# Patient Record
Sex: FEMALE | Race: WHITE | Employment: OTHER | URBAN - METROPOLITAN AREA
[De-identification: names, ages, dates, MRNs, and addresses within clinical notes are randomized per-mention and may not be internally consistent; named-entity substitution may affect disease eponyms.]

---

## 2017-09-19 ENCOUNTER — TELEPHONE (OUTPATIENT)
Dept: FAMILY MEDICINE CLINIC | Age: 82
End: 2017-09-19

## 2017-09-19 RX ORDER — ALPRAZOLAM 0.5 MG/1
TABLET ORAL
Qty: 2 TABLET | Refills: 0 | Status: SHIPPED | OUTPATIENT
Start: 2017-09-19 | End: 2020-05-19

## 2017-09-22 ENCOUNTER — HOSPITAL ENCOUNTER (OUTPATIENT)
Dept: MRI IMAGING | Age: 82
Discharge: HOME | End: 2017-09-22
Attending: INTERNAL MEDICINE | Admitting: INTERNAL MEDICINE

## 2017-09-22 DIAGNOSIS — R27.0 ATAXIA: ICD-10-CM

## 2017-09-22 DIAGNOSIS — R26.89 LOSS OF BALANCE: ICD-10-CM

## 2017-09-28 ENCOUNTER — OFFICE VISIT (OUTPATIENT)
Dept: FAMILY MEDICINE CLINIC | Age: 82
End: 2017-09-28

## 2017-09-28 VITALS
SYSTOLIC BLOOD PRESSURE: 130 MMHG | WEIGHT: 148.6 LBS | HEIGHT: 63 IN | DIASTOLIC BLOOD PRESSURE: 70 MMHG | BODY MASS INDEX: 26.33 KG/M2

## 2017-09-28 DIAGNOSIS — E78.2 MIXED HYPERLIPIDEMIA: ICD-10-CM

## 2017-09-28 DIAGNOSIS — Z23 NEED FOR INFLUENZA VACCINATION: ICD-10-CM

## 2017-09-28 DIAGNOSIS — Z09 HOSPITAL DISCHARGE FOLLOW-UP: Primary | ICD-10-CM

## 2017-09-28 DIAGNOSIS — R27.0 ATAXIA: ICD-10-CM

## 2017-09-28 PROCEDURE — 90662 IIV NO PRSV INCREASED AG IM: CPT | Performed by: FAMILY MEDICINE

## 2017-09-28 PROCEDURE — 99214 OFFICE O/P EST MOD 30 MIN: CPT | Performed by: FAMILY MEDICINE

## 2017-09-28 PROCEDURE — G0008 ADMIN INFLUENZA VIRUS VAC: HCPCS | Performed by: FAMILY MEDICINE

## 2017-09-28 ASSESSMENT — PATIENT HEALTH QUESTIONNAIRE - PHQ9
2. FEELING DOWN, DEPRESSED OR HOPELESS: 0
SUM OF ALL RESPONSES TO PHQ9 QUESTIONS 1 & 2: 1
SUM OF ALL RESPONSES TO PHQ QUESTIONS 1-9: 1
1. LITTLE INTEREST OR PLEASURE IN DOING THINGS: 1

## 2017-09-28 ASSESSMENT — ENCOUNTER SYMPTOMS
COUGH: 0
SORE THROAT: 0
WHEEZING: 0
SHORTNESS OF BREATH: 0
VOMITING: 0
EYE PAIN: 0
BACK PAIN: 0
TROUBLE SWALLOWING: 0
DIARRHEA: 0
ABDOMINAL PAIN: 0
BLOOD IN STOOL: 0

## 2018-08-01 ENCOUNTER — OFFICE VISIT (OUTPATIENT)
Dept: FAMILY MEDICINE CLINIC | Age: 83
End: 2018-08-01

## 2018-08-01 VITALS
SYSTOLIC BLOOD PRESSURE: 130 MMHG | BODY MASS INDEX: 24.65 KG/M2 | HEIGHT: 64 IN | WEIGHT: 144.4 LBS | DIASTOLIC BLOOD PRESSURE: 70 MMHG

## 2018-08-01 DIAGNOSIS — N18.30 CHRONIC KIDNEY DISEASE, STAGE III (MODERATE) (HCC): ICD-10-CM

## 2018-08-01 DIAGNOSIS — L57.0 ACTINIC KERATOSIS: Primary | ICD-10-CM

## 2018-08-01 DIAGNOSIS — E78.2 MIXED HYPERLIPIDEMIA: ICD-10-CM

## 2018-08-01 LAB
ANION GAP SERPL CALCULATED.3IONS-SCNC: 13 MMOL/L (ref 3–16)
BUN BLDV-MCNC: 23 MG/DL (ref 7–20)
CALCIUM SERPL-MCNC: 9.5 MG/DL (ref 8.3–10.6)
CHLORIDE BLD-SCNC: 102 MMOL/L (ref 99–110)
CHOLESTEROL, TOTAL: 215 MG/DL (ref 0–199)
CO2: 23 MMOL/L (ref 21–32)
CREAT SERPL-MCNC: 1 MG/DL (ref 0.6–1.2)
GFR AFRICAN AMERICAN: >60
GFR NON-AFRICAN AMERICAN: 53
GLUCOSE BLD-MCNC: 90 MG/DL (ref 70–99)
HCT VFR BLD CALC: 42.1 % (ref 36–48)
HDLC SERPL-MCNC: 82 MG/DL (ref 40–60)
HEMOGLOBIN: 13.8 G/DL (ref 12–16)
LDL CHOLESTEROL CALCULATED: 109 MG/DL
MCH RBC QN AUTO: 27.4 PG (ref 26–34)
MCHC RBC AUTO-ENTMCNC: 32.8 G/DL (ref 31–36)
MCV RBC AUTO: 83.5 FL (ref 80–100)
PDW BLD-RTO: 15.3 % (ref 12.4–15.4)
PLATELET # BLD: 238 K/UL (ref 135–450)
PMV BLD AUTO: 10 FL (ref 5–10.5)
POTASSIUM SERPL-SCNC: 4.5 MMOL/L (ref 3.5–5.1)
RBC # BLD: 5.04 M/UL (ref 4–5.2)
SODIUM BLD-SCNC: 138 MMOL/L (ref 136–145)
TRIGL SERPL-MCNC: 121 MG/DL (ref 0–150)
VLDLC SERPL CALC-MCNC: 24 MG/DL
WBC # BLD: 9.1 K/UL (ref 4–11)

## 2018-08-01 PROCEDURE — 36415 COLL VENOUS BLD VENIPUNCTURE: CPT | Performed by: FAMILY MEDICINE

## 2018-08-01 PROCEDURE — G8510 SCR DEP NEG, NO PLAN REQD: HCPCS | Performed by: FAMILY MEDICINE

## 2018-08-01 PROCEDURE — 99213 OFFICE O/P EST LOW 20 MIN: CPT | Performed by: FAMILY MEDICINE

## 2018-08-01 PROCEDURE — 3288F FALL RISK ASSESSMENT DOCD: CPT | Performed by: FAMILY MEDICINE

## 2018-08-01 RX ORDER — TRIAMCINOLONE ACETONIDE 1 MG/G
CREAM TOPICAL
Qty: 45 G | Refills: 1 | Status: SHIPPED | OUTPATIENT
Start: 2018-08-01 | End: 2020-05-19

## 2018-08-01 ASSESSMENT — PATIENT HEALTH QUESTIONNAIRE - PHQ9
2. FEELING DOWN, DEPRESSED OR HOPELESS: 0
SUM OF ALL RESPONSES TO PHQ QUESTIONS 1-9: 0
SUM OF ALL RESPONSES TO PHQ9 QUESTIONS 1 & 2: 0
1. LITTLE INTEREST OR PLEASURE IN DOING THINGS: 0

## 2018-08-01 NOTE — PROGRESS NOTES
Subjective:      Patient ID: Johann Baker is a 80 y.o. female. HPI  Skin lesion right hand  Rough scaly skin back right hand    No ulceration    No bleed or disc harge     Also needing lab to check up on cholesterol and rft    Feeling good   no presribed meds at this time    Was given lipitor when hosp for cva like symptoms    Patient never took med    Review of Systems     A complete 14 point  review of systems was completed; pertinent positives are noted in HPI    Vitals:    08/01/18 1307   BP: 130/70   Weight: 144 lb 6.4 oz (65.5 kg)   Height: 5' 3.6\" (1.615 m)     Body mass index is 25.1 kg/m². Wt Readings from Last 3 Encounters:   08/01/18 144 lb 6.4 oz (65.5 kg)   09/28/17 148 lb 9.6 oz (67.4 kg)   09/15/17 152 lb 1.9 oz (69 kg)     BP Readings from Last 3 Encounters:   08/01/18 130/70   09/28/17 130/70   09/15/17 121/75        /70   Ht 5' 3.6\" (1.615 m)   Wt 144 lb 6.4 oz (65.5 kg)   BMI 25.10 kg/m²    Objective:   Physical Exam   Constitutional: She is oriented to person, place, and time. No distress. HENT:   Mouth/Throat: Oropharynx is clear and moist.   Eyes: Conjunctivae are normal.   Cardiovascular: Normal rate, regular rhythm and normal heart sounds. Pulmonary/Chest: Effort normal and breath sounds normal.   Abdominal: There is no tenderness. Neurological: She is oriented to person, place, and time. Psychiatric: Her behavior is normal. Thought content normal.       Assessment:      Assessment/Plan:  Anu Nieves was seen today for blood work. Diagnoses and all orders for this visit:    Actinic keratosis  -     triamcinolone (KENALOG) 0.1 % cream; Apply topically 2 times daily.     Mixed hyperlipidemia  -     Lipid Panel  -     CBC    Chronic kidney disease, stage III (moderate)  -     Basic Metabolic Panel  -     CBC            Plan:      Lab   encouraged asa 81  Lower fat diet   ref derm    appy in november

## 2019-04-10 ENCOUNTER — TELEPHONE (OUTPATIENT)
Dept: FAMILY MEDICINE CLINIC | Age: 84
End: 2019-04-10

## 2019-04-10 RX ORDER — AMOXICILLIN 500 MG/1
500 CAPSULE ORAL 2 TIMES DAILY
Qty: 14 CAPSULE | Refills: 0 | Status: SHIPPED | OUTPATIENT
Start: 2019-04-10 | End: 2019-04-17

## 2019-06-12 ENCOUNTER — TELEPHONE (OUTPATIENT)
Dept: FAMILY MEDICINE CLINIC | Age: 84
End: 2019-06-12

## 2019-06-12 ENCOUNTER — NURSE ONLY (OUTPATIENT)
Dept: FAMILY MEDICINE CLINIC | Age: 84
End: 2019-06-12
Payer: COMMERCIAL

## 2019-06-12 DIAGNOSIS — R30.0 DYSURIA: Primary | ICD-10-CM

## 2019-06-12 LAB
BILIRUBIN, POC: ABNORMAL
BLOOD URINE, POC: ABNORMAL
CLARITY, POC: CLEAR
COLOR, POC: YELLOW
GLUCOSE URINE, POC: ABNORMAL
KETONES, POC: ABNORMAL
LEUKOCYTE EST, POC: ABNORMAL
NITRITE, POC: ABNORMAL
PH, POC: 6
PROTEIN, POC: ABNORMAL
SPECIFIC GRAVITY, POC: <=1.005
UROBILINOGEN, POC: 0.2

## 2019-06-12 PROCEDURE — 81002 URINALYSIS NONAUTO W/O SCOPE: CPT | Performed by: FAMILY MEDICINE

## 2019-06-12 NOTE — TELEPHONE ENCOUNTER
Patient coming in today for a possible UTI lab visit. States it really hurts when she urinates. Please enter lab order.

## 2019-06-13 ENCOUNTER — TELEPHONE (OUTPATIENT)
Dept: FAMILY MEDICINE CLINIC | Age: 84
End: 2019-06-13

## 2019-06-13 NOTE — TELEPHONE ENCOUNTER
Patient is calling because she had a urine test done yesterday and never got a call back with the results. Please call back.

## 2019-06-14 ENCOUNTER — TELEPHONE (OUTPATIENT)
Dept: FAMILY MEDICINE CLINIC | Age: 84
End: 2019-06-14

## 2019-06-14 LAB
ORGANISM: ABNORMAL
URINE CULTURE, ROUTINE: ABNORMAL
URINE CULTURE, ROUTINE: ABNORMAL

## 2019-06-14 RX ORDER — CIPROFLOXACIN 250 MG/1
250 TABLET, FILM COATED ORAL 2 TIMES DAILY
Qty: 10 TABLET | Refills: 0 | Status: SHIPPED | OUTPATIENT
Start: 2019-06-14 | End: 2019-06-19

## 2019-06-14 NOTE — TELEPHONE ENCOUNTER
Patients daughter is calling for the results of the urine culture done earlier in the week. Her mom is still having symptoms. Please call back.

## 2019-11-07 ENCOUNTER — OFFICE VISIT (OUTPATIENT)
Dept: FAMILY MEDICINE CLINIC | Age: 84
End: 2019-11-07
Payer: COMMERCIAL

## 2019-11-07 VITALS
BODY MASS INDEX: 26.29 KG/M2 | DIASTOLIC BLOOD PRESSURE: 78 MMHG | SYSTOLIC BLOOD PRESSURE: 120 MMHG | HEIGHT: 63 IN | WEIGHT: 148.4 LBS

## 2019-11-07 DIAGNOSIS — R10.9 ABDOMINAL PAIN, UNSPECIFIED ABDOMINAL LOCATION: Primary | ICD-10-CM

## 2019-11-07 DIAGNOSIS — E78.5 HYPERLIPIDEMIA, UNSPECIFIED HYPERLIPIDEMIA TYPE: ICD-10-CM

## 2019-11-07 LAB
A/G RATIO: 1.5 (ref 1.1–2.2)
ALBUMIN SERPL-MCNC: 4 G/DL (ref 3.4–5)
ALP BLD-CCNC: 88 U/L (ref 40–129)
ALT SERPL-CCNC: 14 U/L (ref 10–40)
ANION GAP SERPL CALCULATED.3IONS-SCNC: 12 MMOL/L (ref 3–16)
AST SERPL-CCNC: 18 U/L (ref 15–37)
BILIRUB SERPL-MCNC: 0.4 MG/DL (ref 0–1)
BUN BLDV-MCNC: 17 MG/DL (ref 7–20)
CALCIUM SERPL-MCNC: 9.5 MG/DL (ref 8.3–10.6)
CHLORIDE BLD-SCNC: 101 MMOL/L (ref 99–110)
CHOLESTEROL, TOTAL: 233 MG/DL (ref 0–199)
CO2: 24 MMOL/L (ref 21–32)
CREAT SERPL-MCNC: 1.2 MG/DL (ref 0.6–1.2)
GFR AFRICAN AMERICAN: 52
GFR NON-AFRICAN AMERICAN: 43
GLOBULIN: 2.6 G/DL
GLUCOSE BLD-MCNC: 108 MG/DL (ref 70–99)
HCT VFR BLD CALC: 42.1 % (ref 36–48)
HDLC SERPL-MCNC: 63 MG/DL (ref 40–60)
HEMOGLOBIN: 13.8 G/DL (ref 12–16)
LDL CHOLESTEROL CALCULATED: 142 MG/DL
MCH RBC QN AUTO: 27.7 PG (ref 26–34)
MCHC RBC AUTO-ENTMCNC: 32.7 G/DL (ref 31–36)
MCV RBC AUTO: 84.8 FL (ref 80–100)
PDW BLD-RTO: 14.8 % (ref 12.4–15.4)
PLATELET # BLD: 262 K/UL (ref 135–450)
PMV BLD AUTO: 9.6 FL (ref 5–10.5)
POTASSIUM SERPL-SCNC: 5.6 MMOL/L (ref 3.5–5.1)
RBC # BLD: 4.97 M/UL (ref 4–5.2)
SODIUM BLD-SCNC: 137 MMOL/L (ref 136–145)
TOTAL PROTEIN: 6.6 G/DL (ref 6.4–8.2)
TRIGL SERPL-MCNC: 138 MG/DL (ref 0–150)
VLDLC SERPL CALC-MCNC: 28 MG/DL
WBC # BLD: 9 K/UL (ref 4–11)

## 2019-11-07 PROCEDURE — 36415 COLL VENOUS BLD VENIPUNCTURE: CPT | Performed by: FAMILY MEDICINE

## 2019-11-07 PROCEDURE — 99213 OFFICE O/P EST LOW 20 MIN: CPT | Performed by: FAMILY MEDICINE

## 2019-11-07 ASSESSMENT — ENCOUNTER SYMPTOMS
NAUSEA: 0
RHINORRHEA: 0
COUGH: 0
SORE THROAT: 0
WHEEZING: 0
SHORTNESS OF BREATH: 0
SINUS PRESSURE: 0
DIARRHEA: 0
CHEST TIGHTNESS: 0
VOMITING: 0

## 2019-11-08 ASSESSMENT — ENCOUNTER SYMPTOMS
ABDOMINAL DISTENTION: 0
ABDOMINAL PAIN: 1
ANAL BLEEDING: 0
CONSTIPATION: 0
BLOOD IN STOOL: 0
COLOR CHANGE: 0

## 2019-11-13 ENCOUNTER — HOSPITAL ENCOUNTER (OUTPATIENT)
Dept: ULTRASOUND IMAGING | Age: 84
Discharge: HOME | End: 2019-11-13
Payer: COMMERCIAL

## 2019-11-13 DIAGNOSIS — R10.9 ABDOMINAL PAIN, UNSPECIFIED ABDOMINAL LOCATION: ICD-10-CM

## 2019-11-13 PROCEDURE — 76700 US EXAM ABDOM COMPLETE: CPT

## 2019-12-16 ENCOUNTER — TELEPHONE (OUTPATIENT)
Dept: FAMILY MEDICINE CLINIC | Age: 84
End: 2019-12-16

## 2019-12-17 ENCOUNTER — NURSE ONLY (OUTPATIENT)
Dept: FAMILY MEDICINE CLINIC | Age: 84
End: 2019-12-17
Payer: COMMERCIAL

## 2019-12-17 DIAGNOSIS — E87.5 SERUM POTASSIUM ELEVATED: Primary | ICD-10-CM

## 2019-12-17 LAB
ANION GAP SERPL CALCULATED.3IONS-SCNC: 13 MMOL/L (ref 3–16)
BUN BLDV-MCNC: 18 MG/DL (ref 7–20)
CALCIUM SERPL-MCNC: 10 MG/DL (ref 8.3–10.6)
CHLORIDE BLD-SCNC: 106 MMOL/L (ref 99–110)
CO2: 24 MMOL/L (ref 21–32)
CREAT SERPL-MCNC: 1.2 MG/DL (ref 0.6–1.2)
GFR AFRICAN AMERICAN: 52
GFR NON-AFRICAN AMERICAN: 43
GLUCOSE BLD-MCNC: 99 MG/DL (ref 70–99)
POTASSIUM SERPL-SCNC: 5.2 MMOL/L (ref 3.5–5.1)
SODIUM BLD-SCNC: 143 MMOL/L (ref 136–145)

## 2019-12-17 PROCEDURE — 36415 COLL VENOUS BLD VENIPUNCTURE: CPT | Performed by: FAMILY MEDICINE

## 2020-05-11 ENCOUNTER — TELEPHONE (OUTPATIENT)
Dept: FAMILY MEDICINE CLINIC | Age: 85
End: 2020-05-11

## 2020-05-19 ENCOUNTER — OFFICE VISIT (OUTPATIENT)
Dept: FAMILY MEDICINE CLINIC | Age: 85
End: 2020-05-19
Payer: COMMERCIAL

## 2020-05-19 VITALS
WEIGHT: 152.2 LBS | SYSTOLIC BLOOD PRESSURE: 130 MMHG | BODY MASS INDEX: 26.97 KG/M2 | DIASTOLIC BLOOD PRESSURE: 80 MMHG | HEIGHT: 63 IN

## 2020-05-19 LAB
ANION GAP SERPL CALCULATED.3IONS-SCNC: 8 MMOL/L (ref 3–16)
BUN BLDV-MCNC: 17 MG/DL (ref 7–20)
CALCIUM SERPL-MCNC: 9.4 MG/DL (ref 8.3–10.6)
CHLORIDE BLD-SCNC: 103 MMOL/L (ref 99–110)
CHOLESTEROL, TOTAL: 232 MG/DL (ref 0–199)
CO2: 26 MMOL/L (ref 21–32)
CREAT SERPL-MCNC: 0.9 MG/DL (ref 0.6–1.2)
GFR AFRICAN AMERICAN: >60
GFR NON-AFRICAN AMERICAN: 59
GLUCOSE BLD-MCNC: 104 MG/DL (ref 70–99)
HCT VFR BLD CALC: 42.4 % (ref 36–48)
HDLC SERPL-MCNC: 65 MG/DL (ref 40–60)
HEMOGLOBIN: 13.7 G/DL (ref 12–16)
LDL CHOLESTEROL CALCULATED: 135 MG/DL
MCH RBC QN AUTO: 27.5 PG (ref 26–34)
MCHC RBC AUTO-ENTMCNC: 32.4 G/DL (ref 31–36)
MCV RBC AUTO: 84.9 FL (ref 80–100)
PDW BLD-RTO: 15 % (ref 12.4–15.4)
PLATELET # BLD: 248 K/UL (ref 135–450)
PMV BLD AUTO: 9.8 FL (ref 5–10.5)
POTASSIUM SERPL-SCNC: 5 MMOL/L (ref 3.5–5.1)
RBC # BLD: 5 M/UL (ref 4–5.2)
SODIUM BLD-SCNC: 137 MMOL/L (ref 136–145)
T4 FREE: 1.2 NG/DL (ref 0.9–1.8)
TRIGL SERPL-MCNC: 162 MG/DL (ref 0–150)
TSH REFLEX: 4.65 UIU/ML (ref 0.27–4.2)
VLDLC SERPL CALC-MCNC: 32 MG/DL
WBC # BLD: 7.9 K/UL (ref 4–11)

## 2020-05-19 PROCEDURE — 99213 OFFICE O/P EST LOW 20 MIN: CPT | Performed by: FAMILY MEDICINE

## 2020-05-19 PROCEDURE — G8510 SCR DEP NEG, NO PLAN REQD: HCPCS | Performed by: FAMILY MEDICINE

## 2020-05-19 PROCEDURE — 3288F FALL RISK ASSESSMENT DOCD: CPT | Performed by: FAMILY MEDICINE

## 2020-05-19 PROCEDURE — 36415 COLL VENOUS BLD VENIPUNCTURE: CPT | Performed by: FAMILY MEDICINE

## 2020-05-19 ASSESSMENT — ENCOUNTER SYMPTOMS
WHEEZING: 0
SHORTNESS OF BREATH: 0
EYE PAIN: 0
COUGH: 0
ABDOMINAL PAIN: 0

## 2020-05-19 ASSESSMENT — PATIENT HEALTH QUESTIONNAIRE - PHQ9
SUM OF ALL RESPONSES TO PHQ QUESTIONS 1-9: 0
2. FEELING DOWN, DEPRESSED OR HOPELESS: 0
SUM OF ALL RESPONSES TO PHQ QUESTIONS 1-9: 0
SUM OF ALL RESPONSES TO PHQ9 QUESTIONS 1 & 2: 0
1. LITTLE INTEREST OR PLEASURE IN DOING THINGS: 0

## 2020-05-19 NOTE — PROGRESS NOTES
ulceration       Neurological:      General: No focal deficit present. Mental Status: She is alert and oriented to person, place, and time. Psychiatric:         Mood and Affect: Mood normal.         Thought Content: Thought content normal.         Assessment:      Assessment/Plan:  Merline Pantoja was seen today for other, other and blood work. 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

## 2020-05-26 ENCOUNTER — TELEPHONE (OUTPATIENT)
Dept: FAMILY MEDICINE CLINIC | Age: 85
End: 2020-05-26

## 2020-05-29 NOTE — TELEPHONE ENCOUNTER
Samia Cortes calling back again, states no one has called her back about the shower chair. States this is the 4th call to the office and if her mother doesn't get this shower chair and she falls this is going to be a big problem. States she will call back every hour until this is resolved today.

## 2020-05-29 NOTE — TELEPHONE ENCOUNTER
Order faxed by Nita Ríos.  Confirmation of receipt received.   [address] twice, but no one responded

## 2020-06-22 ENCOUNTER — TELEPHONE (OUTPATIENT)
Dept: FAMILY MEDICINE CLINIC | Age: 85
End: 2020-06-22

## 2020-06-22 NOTE — TELEPHONE ENCOUNTER
Patient called and states that she never received the results of her lab work. She would like a copy of her results mailed to her.

## 2020-09-28 ENCOUNTER — OFFICE VISIT (OUTPATIENT)
Dept: FAMILY MEDICINE CLINIC | Age: 85
End: 2020-09-28
Payer: COMMERCIAL

## 2020-09-28 VITALS
HEIGHT: 63 IN | TEMPERATURE: 98.6 F | DIASTOLIC BLOOD PRESSURE: 80 MMHG | WEIGHT: 148.6 LBS | BODY MASS INDEX: 26.33 KG/M2 | SYSTOLIC BLOOD PRESSURE: 130 MMHG

## 2020-09-28 PROCEDURE — 99213 OFFICE O/P EST LOW 20 MIN: CPT | Performed by: FAMILY MEDICINE

## 2020-09-28 ASSESSMENT — PATIENT HEALTH QUESTIONNAIRE - PHQ9
SUM OF ALL RESPONSES TO PHQ9 QUESTIONS 1 & 2: 0
SUM OF ALL RESPONSES TO PHQ QUESTIONS 1-9: 0
SUM OF ALL RESPONSES TO PHQ QUESTIONS 1-9: 0
2. FEELING DOWN, DEPRESSED OR HOPELESS: 0
1. LITTLE INTEREST OR PLEASURE IN DOING THINGS: 0

## 2020-09-28 NOTE — PROGRESS NOTES
Pupils are equal, round, and reactive to light. Neck:      Musculoskeletal: Neck supple. Cardiovascular:      Rate and Rhythm: Normal rate and regular rhythm. Pulmonary:      Effort: Pulmonary effort is normal.      Breath sounds: Normal breath sounds. Abdominal:      General: There is no distension. Palpations: Abdomen is soft. Tenderness: There is no abdominal tenderness. Neurological:      General: No focal deficit present. Mental Status: She is alert and oriented to person, place, and time. Cranial Nerves: No cranial nerve deficit. Sensory: No sensory deficit. Motor: No weakness. Gait: Gait normal.   Psychiatric:         Mood and Affect: Mood normal.         Thought Content: Thought content normal.         Assessment:      Assessment/Plan:  Flores Reid was seen today for headache.     Diagnoses and all orders for this visit:    Cervical dysfunction  -     diclofenac (VOLTAREN) 50 MG EC tablet; 1 tablet twice a day as needed for head and neck pain            Plan:      No clinical signs of cns involvement  Will try nsaid initially   if no better, may consider imaging  hoem exercise program as well          [address] [place] [address], DO

## 2020-10-05 ASSESSMENT — ENCOUNTER SYMPTOMS
SHORTNESS OF BREATH: 0
COUGH: 0
WHEEZING: 0
EYE PAIN: 0
ABDOMINAL PAIN: 0

## 2021-03-22 ENCOUNTER — TELEPHONE (OUTPATIENT)
Dept: FAMILY MEDICINE CLINIC | Age: 86
End: 2021-03-22

## 2021-03-23 ENCOUNTER — TELEPHONE (OUTPATIENT)
Dept: PRIMARY CARE CLINIC | Age: 86
End: 2021-03-23

## 2021-03-23 NOTE — TELEPHONE ENCOUNTER
Pts daughter Jimenez Marinelli called. 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 She has become symptomatic in the last couple of days with dry cough but no SOB,fever,or chest pain. Patient has not been COVID tested. Jimenez Marinelli is wanting Ivermectin or Hydroxychloroquine called in . Discussed with daughter that these are not recommended medications for treatment of presumed COVID. Advised family to call [facility] office in the AM and speak to PCP about patients current symptoms and need for further evaluation.

## 2021-03-25 ENCOUNTER — VIRTUAL VISIT (OUTPATIENT)
Dept: FAMILY MEDICINE CLINIC | Age: 86
End: 2021-03-25
Payer: MEDICARE

## 2021-03-25 DIAGNOSIS — J06.9 VIRAL URI: Primary | ICD-10-CM

## 2021-03-25 PROCEDURE — 99213 OFFICE O/P EST LOW 20 MIN: CPT | Performed by: FAMILY MEDICINE

## 2021-03-25 NOTE — PROGRESS NOTES
3/25/2021    TELEHEALTH EVALUATION -- Audio/Visual (During [id number] public health emergency)    HPI:    Batsheva Tee (:  1933) has requested an audio/video evaluation for the following concern(s):    Patient presented for a virtual visit with her daughter due to having symptoms of cough, body aches, chills, no fever at this time started  two days ago, family members have tested positive for COVID 19, she hasn't been tested at this point,  patient denied vomiting, n, or diarrhea. She stated multiple times that she feels improved today. Her daughter is requesting Ivermectin/hydroxychloroquin for her mother to help prevent the progression of the disease. Today, denied chest pain, sob, n,v, or diarrhea. Review of Systems   Constitutional: Positive for activity change and fatigue. Negative for fever and unexpected weight change. HENT: Positive for congestion and rhinorrhea. Negative for ear pain and sore throat. Respiratory: Positive for cough. Negative for shortness of breath. Cardiovascular: Negative for chest pain. Gastrointestinal: Negative for abdominal pain, diarrhea and nausea. Musculoskeletal: Positive for arthralgias. Prior to Visit Medications    Medication Sig Taking?  Authorizing Provider   diclofenac (VOLTAREN) 50 MG EC tablet 1 tablet twice a day as needed for head and neck pain  Iliana Baugh DO   aspirin 81 MG EC tablet Take 1 tablet by mouth daily  Porsha Varghese MD   psyllium (KONSYL) 28.3 % PACK Take 1 packet by mouth daily  Historical Provider, MD   Multiple Vitamins-Minerals (MULTIVITAMIN PO) Take by mouth  Historical ProviderMD TROY 0.01 % SOLN ophthalmic drops Place 1 drop into both eyes nightly  Historical Provider, MD       Social History     Tobacco Use    Smoking status: Never Smoker    Smokeless tobacco: Never Used   Substance Use Topics    Alcohol use: No    Drug use: No        Allergies   Allergen Reactions    Morphine     Sulfa Antibiotics        PHYSICAL EXAMINATION:  [ INSTRUCTIONS:  \"[x]\" Indicates a positive item  \"[]\" Indicates a negative item  -- DELETE ALL ITEMS NOT EXAMINED]  Vital Signs: (As obtained by patient/caregiver or practitioner observation)  See chart  Blood pressure-  Heart rate-    Respiratory rate-    Temperature-  Pulse oximetry-     Constitutional: [x] Appears well-developed and well-nourished [] No apparent distress      [] Abnormal-   Mental status  [x] Alert and awake  [] Oriented to person/place/time []Able to follow commands      Eyes:  EOM    []  Normal  [] Abnormal-  Sclera  [x]  Normal  [] Abnormal -         Discharge []  None visible  [] Abnormal -    HENT:   [x] Normocephalic, atraumatic. [] Abnormal   [] Mouth/Throat: Mucous membranes are moist.     External Ears [x] Normal  [] Abnormal-     Neck: [x] No visualized mass     Pulmonary/Chest: [x] Respiratory effort normal.  [] No visualized signs of difficulty breathing or respiratory distress        [] Abnormal-      Musculoskeletal:   [] Normal gait with no signs of ataxia         [x] Normal range of motion of neck        [] Abnormal-       Neurological:        [x] No Facial Asymmetry (Cranial nerve 7 motor function) (limited exam to video visit)          [] No gaze palsy        [] Abnormal-         Skin:        [x] No significant exanthematous lesions or discoloration noted on facial skin         [] Abnormal-            Psychiatric:       [x] Normal Affect [] No Hallucinations        [] Abnormal-     Other pertinent observable physical exam findings-     ASSESSMENT/PLAN:  1. Viral URI  I educated on the need to be tested  Push fluids  Rest  Tylenol for symptoms  I discussed Ivermectin and Hydroxychloroquin and I don't prescribe these medications outpatient for COVID  Educated on signs and symptoms for immediate evaluation in the ER. Answered questions   Reassured the patient  Family was frustrated with me due to not prescribing the medication. Return in about 3 months (around 6/25/2021). [facility] was evaluated through a synchronous (real-time) audio-video encounter. The patient (or guardian if applicable) is aware that this is a billable service. Verbal consent to proceed has been obtained within the past 12 months. The visit was conducted pursuant to the emergency declaration under the [address] [place] [address] and the [employer] Act. Patient identification was verified, and a caregiver was present when appropriate. The patient was located in a state where the provider was credentialed to provide care. Total time spent on this encounter: Not billed by time    --Tate Payne DO on 3/26/2021 at 7:59 AM    An electronic signature was used to authenticate this note.

## 2021-03-26 ASSESSMENT — ENCOUNTER SYMPTOMS
SORE THROAT: 0
DIARRHEA: 0
NAUSEA: 0
ABDOMINAL PAIN: 0
COUGH: 1
SHORTNESS OF BREATH: 0
RHINORRHEA: 1

## 2021-04-20 ENCOUNTER — OFFICE VISIT (OUTPATIENT)
Dept: FAMILY MEDICINE CLINIC | Age: 86
End: 2021-04-20
Payer: MEDICARE

## 2021-04-20 VITALS
WEIGHT: 147.6 LBS | HEIGHT: 63 IN | BODY MASS INDEX: 26.15 KG/M2 | SYSTOLIC BLOOD PRESSURE: 134 MMHG | DIASTOLIC BLOOD PRESSURE: 70 MMHG

## 2021-04-20 DIAGNOSIS — H40.9 GLAUCOMA OF BOTH EYES, UNSPECIFIED GLAUCOMA TYPE: ICD-10-CM

## 2021-04-20 DIAGNOSIS — N18.31 STAGE 3A CHRONIC KIDNEY DISEASE (HCC): Primary | ICD-10-CM

## 2021-04-20 DIAGNOSIS — E78.2 MIXED HYPERLIPIDEMIA: ICD-10-CM

## 2021-04-20 DIAGNOSIS — Z76.89 ENCOUNTER TO ESTABLISH CARE: ICD-10-CM

## 2021-04-20 LAB
A/G RATIO: 2 (ref 1.1–2.2)
ALBUMIN SERPL-MCNC: 4.1 G/DL (ref 3.4–5)
ALP BLD-CCNC: 92 U/L (ref 40–129)
ALT SERPL-CCNC: 17 U/L (ref 10–40)
ANION GAP SERPL CALCULATED.3IONS-SCNC: 11 MMOL/L (ref 3–16)
AST SERPL-CCNC: 18 U/L (ref 15–37)
BILIRUB SERPL-MCNC: 0.4 MG/DL (ref 0–1)
BUN BLDV-MCNC: 13 MG/DL (ref 7–20)
CALCIUM SERPL-MCNC: 8.8 MG/DL (ref 8.3–10.6)
CHLORIDE BLD-SCNC: 106 MMOL/L (ref 99–110)
CHOLESTEROL, TOTAL: 206 MG/DL (ref 0–199)
CO2: 23 MMOL/L (ref 21–32)
CREAT SERPL-MCNC: 1.2 MG/DL (ref 0.6–1.2)
GFR AFRICAN AMERICAN: 51
GFR NON-AFRICAN AMERICAN: 42
GLOBULIN: 2.1 G/DL
GLUCOSE BLD-MCNC: 96 MG/DL (ref 70–99)
HCT VFR BLD CALC: 42.1 % (ref 36–48)
HDLC SERPL-MCNC: 55 MG/DL (ref 40–60)
HEMOGLOBIN: 13.6 G/DL (ref 12–16)
LDL CHOLESTEROL CALCULATED: 127 MG/DL
MCH RBC QN AUTO: 26.8 PG (ref 26–34)
MCHC RBC AUTO-ENTMCNC: 32.2 G/DL (ref 31–36)
MCV RBC AUTO: 83 FL (ref 80–100)
PDW BLD-RTO: 15.4 % (ref 12.4–15.4)
PLATELET # BLD: 216 K/UL (ref 135–450)
PMV BLD AUTO: 10.5 FL (ref 5–10.5)
POTASSIUM SERPL-SCNC: 4.3 MMOL/L (ref 3.5–5.1)
RBC # BLD: 5.07 M/UL (ref 4–5.2)
SODIUM BLD-SCNC: 140 MMOL/L (ref 136–145)
TOTAL PROTEIN: 6.2 G/DL (ref 6.4–8.2)
TRIGL SERPL-MCNC: 120 MG/DL (ref 0–150)
TSH REFLEX FT4: 2.8 UIU/ML (ref 0.27–4.2)
VITAMIN D 25-HYDROXY: 29.6 NG/ML
VLDLC SERPL CALC-MCNC: 24 MG/DL
WBC # BLD: 9.2 K/UL (ref 4–11)

## 2021-04-20 PROCEDURE — 99214 OFFICE O/P EST MOD 30 MIN: CPT | Performed by: FAMILY MEDICINE

## 2021-04-20 PROCEDURE — G8417 CALC BMI ABV UP PARAM F/U: HCPCS | Performed by: FAMILY MEDICINE

## 2021-04-20 PROCEDURE — G8427 DOCREV CUR MEDS BY ELIG CLIN: HCPCS | Performed by: FAMILY MEDICINE

## 2021-04-20 PROCEDURE — 1090F PRES/ABSN URINE INCON ASSESS: CPT | Performed by: FAMILY MEDICINE

## 2021-04-20 PROCEDURE — 1123F ACP DISCUSS/DSCN MKR DOCD: CPT | Performed by: FAMILY MEDICINE

## 2021-04-20 PROCEDURE — 1036F TOBACCO NON-USER: CPT | Performed by: FAMILY MEDICINE

## 2021-04-20 PROCEDURE — 36415 COLL VENOUS BLD VENIPUNCTURE: CPT | Performed by: FAMILY MEDICINE

## 2021-04-20 PROCEDURE — 4040F PNEUMOC VAC/ADMIN/RCVD: CPT | Performed by: FAMILY MEDICINE

## 2021-04-20 ASSESSMENT — ENCOUNTER SYMPTOMS
VOMITING: 0
WHEEZING: 0
RHINORRHEA: 0
SHORTNESS OF BREATH: 0
NAUSEA: 0
SORE THROAT: 0
ABDOMINAL PAIN: 0
COUGH: 0
CHEST TIGHTNESS: 0
SINUS PRESSURE: 0
DIARRHEA: 0

## 2021-04-20 ASSESSMENT — PATIENT HEALTH QUESTIONNAIRE - PHQ9
SUM OF ALL RESPONSES TO PHQ QUESTIONS 1-9: 0
SUM OF ALL RESPONSES TO PHQ QUESTIONS 1-9: 0

## 2021-04-20 NOTE — PROGRESS NOTES
2021     Shanika Hendricks (:  1933) is a 80 y.o. female, here for evaluation of the following medical concerns:    HPI     Encounter to establish care- patient presented to the clinic to establish care with a new pcp. The patient's previous pcp is retired. The patient is feeling well today and denied a new problem. She has several chronic medical conditions to discuss today. She denied tobacco use, alcohol use, or drug use. Glaucoma- [place] -sees specialist every 3-6 months, feels well today. Hyperlipidemia- well controlled, trying to control with diet and exercise . Today, denied chest pain, sob, n, v, or diarrhea. Review of Systems   Constitutional: Negative for activity change, fatigue, fever and unexpected weight change. HENT: Negative for congestion, ear pain, rhinorrhea, sinus pressure and sore throat. Eyes: Negative for visual disturbance. Respiratory: Negative for cough, chest tightness, shortness of breath and wheezing. Cardiovascular: Negative for chest pain and leg swelling. Gastrointestinal: Negative for abdominal pain, diarrhea, nausea and vomiting. Endocrine: Negative for cold intolerance, heat intolerance, polydipsia and polyphagia. Musculoskeletal: Negative for arthralgias. Skin: Negative for rash. Neurological: Negative for dizziness, tremors, syncope, numbness and headaches. Psychiatric/Behavioral: Negative for dysphoric mood. The patient is not nervous/anxious. Prior to Visit Medications    Medication Sig Taking?  Authorizing Provider   psyllium (KONSYL) 28.3 % PACK Take 1 packet by mouth daily Yes Historical Provider, MD   Multiple Vitamins-Minerals (MULTIVITAMIN PO) Take by mouth Yes Historical Provider, MD TROY 0.01 % SOLN ophthalmic drops Place 1 drop into both eyes nightly Yes Historical Provider, MD   diclofenac (VOLTAREN) 50 MG EC tablet 1 tablet twice a day as needed for head and neck pain  Niki Fuchs DO   aspirin 81 MG EC tablet Take 1 tablet by mouth daily  Patient not taking: Reported on 4/20/2021  Kristin Tilley MD        Social History     Tobacco Use    Smoking status: Never Smoker    Smokeless tobacco: Never Used   Substance Use Topics    Alcohol use: No        Vitals:    04/20/21 1022   BP: 134/70   Weight: 147 lb 9.6 oz (67 kg)   Height: 5' 3\" (1.6 m)     Estimated body mass index is 26.15 kg/m² as calculated from the following:    Height as of this encounter: 5' 3\" (1.6 m). Weight as of this encounter: 147 lb 9.6 oz (67 kg). Physical Exam  Vitals signs and nursing note reviewed. Constitutional:       Appearance: She is well-developed. HENT:      Head: Normocephalic and atraumatic. Right Ear: External ear normal.      Left Ear: External ear normal.   Cardiovascular:      Rate and Rhythm: Normal rate and regular rhythm. Heart sounds: Normal heart sounds. No murmur. Pulmonary:      Effort: Pulmonary effort is normal.      Breath sounds: Normal breath sounds. No wheezing. Abdominal:      General: Bowel sounds are normal.      Palpations: Abdomen is soft. Tenderness: There is no abdominal tenderness. Musculoskeletal: Normal range of motion. Skin:     General: Skin is warm and dry. Neurological:      Mental Status: She is alert and oriented to person, place, and time. Psychiatric:         Behavior: Behavior normal.         ASSESSMENT/PLAN:  1. Encounter to establish care  Care established  Medications reviewed  Questions answered  Labs obtained  RTC in three months for follow up. - CBC  - Comprehensive Metabolic Panel  - TSH WITH REFLEX TO FT4  - Lipid Panel  - Vitamin D 25 Hydroxy    2. Stage 3a chronic kidney disease  Labs obtained  Educated on the importance of having this done. Answered questions  - CBC  - Comprehensive Metabolic Panel  - TSH WITH REFLEX TO FT4  - Lipid Panel  - Vitamin D 25 Hydroxy    3. Mixed hyperlipidemia  Take medication as prescribed.    Discussed the need to exercise 30 minutes each day. Monitor diet, avoid fried foods etc... Educated on need to reduce cholesterol in diet and results of poor diet. - CBC  - Comprehensive Metabolic Panel  - TSH WITH REFLEX TO FT4  - Lipid Panel  - Vitamin D 25 Hydroxy    4. Glaucoma of both eyes, unspecified glaucoma type  Stable  Continue with medication  Keep appointments with specialist.   Answered questions  - CBC  - Comprehensive Metabolic Panel  - TSH WITH REFLEX TO FT4  - Lipid Panel  - Vitamin D 25 Hydroxy      Return in about 6 months (around 10/20/2021).

## 2021-05-05 ENCOUNTER — TELEPHONE (OUTPATIENT)
Dept: FAMILY MEDICINE CLINIC | Age: 86
End: 2021-05-05

## 2021-07-02 ENCOUNTER — TELEPHONE (OUTPATIENT)
Dept: FAMILY MEDICINE CLINIC | Age: 86
End: 2021-07-02

## 2021-07-02 ENCOUNTER — OFFICE VISIT (OUTPATIENT)
Dept: PRIMARY CARE CLINIC | Age: 86
End: 2021-07-02
Payer: MEDICARE

## 2021-07-02 VITALS
BODY MASS INDEX: 26.26 KG/M2 | HEART RATE: 87 BPM | SYSTOLIC BLOOD PRESSURE: 132 MMHG | DIASTOLIC BLOOD PRESSURE: 84 MMHG | TEMPERATURE: 97.7 F | OXYGEN SATURATION: 98 % | WEIGHT: 148.2 LBS | HEIGHT: 63 IN

## 2021-07-02 DIAGNOSIS — R53.83 OTHER FATIGUE: ICD-10-CM

## 2021-07-02 DIAGNOSIS — N18.31 STAGE 3A CHRONIC KIDNEY DISEASE (HCC): ICD-10-CM

## 2021-07-02 DIAGNOSIS — M79.89 LEG SWELLING: Primary | ICD-10-CM

## 2021-07-02 LAB
ANION GAP SERPL CALCULATED.3IONS-SCNC: 12 MMOL/L (ref 3–16)
BASOPHILS ABSOLUTE: 0.1 K/UL (ref 0–0.2)
BASOPHILS RELATIVE PERCENT: 1.3 %
BILIRUBIN, POC: ABNORMAL
BLOOD URINE, POC: ABNORMAL
BUN BLDV-MCNC: 15 MG/DL (ref 7–20)
CALCIUM SERPL-MCNC: 9.2 MG/DL (ref 8.3–10.6)
CHLORIDE BLD-SCNC: 105 MMOL/L (ref 99–110)
CLARITY, POC: ABNORMAL
CO2: 22 MMOL/L (ref 21–32)
COLOR, POC: ABNORMAL
CREAT SERPL-MCNC: 1.2 MG/DL (ref 0.6–1.2)
EOSINOPHILS ABSOLUTE: 1.5 K/UL (ref 0–0.6)
EOSINOPHILS RELATIVE PERCENT: 19.2 %
GFR AFRICAN AMERICAN: 51
GFR NON-AFRICAN AMERICAN: 42
GLUCOSE BLD-MCNC: 100 MG/DL (ref 70–99)
GLUCOSE URINE, POC: ABNORMAL
HCT VFR BLD CALC: 42.1 % (ref 36–48)
HEMOGLOBIN: 13.7 G/DL (ref 12–16)
KETONES, POC: ABNORMAL
LEUKOCYTE EST, POC: ABNORMAL
LYMPHOCYTES ABSOLUTE: 1.8 K/UL (ref 1–5.1)
LYMPHOCYTES RELATIVE PERCENT: 22.6 %
MCH RBC QN AUTO: 27.2 PG (ref 26–34)
MCHC RBC AUTO-ENTMCNC: 32.6 G/DL (ref 31–36)
MCV RBC AUTO: 83.3 FL (ref 80–100)
MONOCYTES ABSOLUTE: 0.6 K/UL (ref 0–1.3)
MONOCYTES RELATIVE PERCENT: 7.5 %
NEUTROPHILS ABSOLUTE: 3.8 K/UL (ref 1.7–7.7)
NEUTROPHILS RELATIVE PERCENT: 49.4 %
NITRITE, POC: ABNORMAL
PDW BLD-RTO: 15.9 % (ref 12.4–15.4)
PH, POC: 5.5
PLATELET # BLD: 286 K/UL (ref 135–450)
PMV BLD AUTO: 9.6 FL (ref 5–10.5)
POTASSIUM SERPL-SCNC: 4.9 MMOL/L (ref 3.5–5.1)
PROTEIN, POC: ABNORMAL
RBC # BLD: 5.05 M/UL (ref 4–5.2)
SODIUM BLD-SCNC: 139 MMOL/L (ref 136–145)
SPECIFIC GRAVITY, POC: 1.03
TSH REFLEX: 3.34 UIU/ML (ref 0.27–4.2)
UROBILINOGEN, POC: 0.2
WBC # BLD: 7.8 K/UL (ref 4–11)

## 2021-07-02 PROCEDURE — G8427 DOCREV CUR MEDS BY ELIG CLIN: HCPCS | Performed by: NURSE PRACTITIONER

## 2021-07-02 PROCEDURE — 4040F PNEUMOC VAC/ADMIN/RCVD: CPT | Performed by: NURSE PRACTITIONER

## 2021-07-02 PROCEDURE — 36415 COLL VENOUS BLD VENIPUNCTURE: CPT | Performed by: NURSE PRACTITIONER

## 2021-07-02 PROCEDURE — 1123F ACP DISCUSS/DSCN MKR DOCD: CPT | Performed by: NURSE PRACTITIONER

## 2021-07-02 PROCEDURE — 1036F TOBACCO NON-USER: CPT | Performed by: NURSE PRACTITIONER

## 2021-07-02 PROCEDURE — 81002 URINALYSIS NONAUTO W/O SCOPE: CPT | Performed by: NURSE PRACTITIONER

## 2021-07-02 PROCEDURE — 1090F PRES/ABSN URINE INCON ASSESS: CPT | Performed by: NURSE PRACTITIONER

## 2021-07-02 PROCEDURE — 99214 OFFICE O/P EST MOD 30 MIN: CPT | Performed by: NURSE PRACTITIONER

## 2021-07-02 PROCEDURE — G8417 CALC BMI ABV UP PARAM F/U: HCPCS | Performed by: NURSE PRACTITIONER

## 2021-07-02 ASSESSMENT — ENCOUNTER SYMPTOMS
COUGH: 0
CHEST TIGHTNESS: 0
WHEEZING: 0
EYE PAIN: 0
NAUSEA: 0
BLOOD IN STOOL: 0
SHORTNESS OF BREATH: 0
FACIAL SWELLING: 0
SINUS PAIN: 0
SORE THROAT: 0
VOMITING: 0
ABDOMINAL PAIN: 0
TROUBLE SWALLOWING: 0
CONSTIPATION: 0
DIARRHEA: 0

## 2021-07-02 NOTE — PROGRESS NOTES
hematuria. Musculoskeletal: Negative for arthralgias and myalgias. Skin: Negative for pallor and rash. Allergic/Immunologic: Negative for environmental allergies and food allergies. Neurological: Negative for dizziness, syncope, weakness, numbness and headaches. Hematological: Negative for adenopathy. Does not bruise/bleed easily. Psychiatric/Behavioral: Negative for dysphoric mood and suicidal ideas. Prior to Visit Medications    Medication Sig Taking? Authorizing Provider   Elastic Bandages & Supports (MEDICAL COMPRESSION STOCKINGS) MISC 1 each by Does not apply route daily as needed (20-30mm) 20-30 mm compression Yes MARGARITO Chamberlain - CNP   psyllium (KONSYL) 28.3 % PACK Take 1 packet by mouth daily  Yes Historical Provider, MD TROY 0.01 % SOLN ophthalmic drops Place 1 drop into both eyes nightly Yes Historical Provider, MD   diclofenac (VOLTAREN) 50 MG EC tablet 1 tablet twice a day as needed for head and neck pain  Patient not taking: Reported on 7/2/2021  García Jin DO   aspirin 81 MG EC tablet Take 1 tablet by mouth daily  Patient not taking: Reported on 4/20/2021  Sophie Mckeon MD   Multiple Vitamins-Minerals (MULTIVITAMIN PO) Take by mouth  Patient not taking: Reported on 7/2/2021  Historical Provider, MD        Allergies   Allergen Reactions    Morphine     Sulfa Antibiotics Hives       Past Medical History:   Diagnosis Date    Diverticulitis     Glaucoma        Past Surgical History:   Procedure Laterality Date    COLONOSCOPY  2007       Social History     Socioeconomic History    Marital status:       Spouse name: Not on file    Number of children: Not on file    Years of education: Not on file    Highest education level: Not on file   Occupational History    Not on file   Tobacco Use    Smoking status: Never Smoker    Smokeless tobacco: Never Used   Substance and Sexual Activity    Alcohol use: No    Drug use: No    Sexual activity: Not on file Other Topics Concern    Not on file   Social History Narrative    Not on file     Social Determinants of Health     Financial Resource Strain:     Difficulty of Paying Living Expenses:    Food Insecurity:     Worried About Running Out of Food in the Last Year:     [address] in the Last Year:    Transportation Needs:     Lack of Transportation (Medical):  Lack of Transportation (Non-Medical):    Physical Activity:     Days of Exercise per Week:     Minutes of Exercise per Session:    Stress:     Feeling of Stress :    Social Connections:     Frequency of Communication with Friends and Family:     Frequency of Social Gatherings with Friends and Family:     Attends [employer] Services:     Active Member of Clubs or Organizations:     Attends Club or Organization Meetings:     Marital Status:    Intimate Partner Violence:     Fear of Current or Ex-Partner:     Emotionally Abused:     Physically Abused:     Sexually Abused:         No family history on file. Vitals:    07/02/21 1224   BP: 132/84   Pulse: 87   Temp: 97.7 °F (36.5 °C)   SpO2: 98%   Weight: 148 lb 3.2 oz (67.2 kg)   Height: 5' 3\" (1.6 m)     Estimated body mass index is 26.25 kg/m² as calculated from the following:    Height as of this encounter: 5' 3\" (1.6 m). Weight as of this encounter: 148 lb 3.2 oz (67.2 kg). Physical Exam  Vitals reviewed. Constitutional:       Appearance: She is well-developed. HENT:      Right Ear: Tympanic membrane, ear canal and external ear normal.      Left Ear: Tympanic membrane, ear canal and external ear normal.      Nose: Nose normal.      Mouth/Throat:      Mouth: Mucous membranes are moist.      Pharynx: Oropharynx is clear. Eyes:      Extraocular Movements: Extraocular movements intact. Conjunctiva/sclera: Conjunctivae normal.      Pupils: Pupils are equal, round, and reactive to light. Neck:      Thyroid: No thyromegaly.    Cardiovascular:      Rate and Rhythm: Normal rate and regular rhythm. Pulses: Normal pulses. Radial pulses are 2+ on the right side and 2+ on the left side. Dorsalis pedis pulses are 2+ on the right side and 2+ on the left side. Posterior tibial pulses are 2+ on the right side and 2+ on the left side. Heart sounds: Normal heart sounds. No murmur heard. Comments: Trace edema of bilateral ankles  Pulmonary:      Effort: Pulmonary effort is normal.      Breath sounds: Normal breath sounds. Abdominal:      General: Bowel sounds are normal.      Palpations: Abdomen is soft. Tenderness: There is no abdominal tenderness. Musculoskeletal:         General: Normal range of motion. Cervical back: Normal range of motion and neck supple. Skin:     General: Skin is warm and dry. Capillary Refill: Capillary refill takes less than 2 seconds. Neurological:      General: No focal deficit present. Mental Status: She is alert and oriented to person, place, and time. Psychiatric:         Mood and Affect: Mood normal.         Behavior: Behavior normal.         Judgment: Judgment normal.         ASSESSMENT/PLAN:  1. Leg swelling  >Trace   Suspect leg swelling may be secondary to chronic kidney  disease. She does not display any signs or symptoms of possible blood clot in  legsDid discuss with patient keep properly hydrated and avoid  NSAIDs. Patient should also monitor packaged foods for salt  content. Patient should follow-up with she experiences increase in  leg swelling or shortness of breath  -     BASIC METABOLIC PANEL  -     CBC Auto Differential  -     TSH with Reflex  -     POCT Urinalysis no Micro  -     Elastic Bandages & Supports (MEDICAL COMPRESSION STOCKINGS) MISC; DAILY PRN Starting Fri 7/2/2021, Disp-1 each, R-2, [id number] mm compression  2. Other fatigue  >Stable   I suspect that the fatigue may be caused by the Cosopt drops.    Patient experiences increase in fatigue, weakness to consult with her ophthalmologist or PCP  -     TSH with Reflex  3. Stage 3a chronic kidney disease (HCC)  -     TSH with Reflex      I have spent a total 30 minutes face-to-face with this patient and/or guardian. Over 50% of this time was spent on counseling and care coordination. Return if symptoms worsen or fail to improve.

## 2021-07-02 NOTE — TELEPHONE ENCOUNTER
Per patient's daughter, patient states she is more SOB, weak, and increased swelling in her BLE. She is requesting an urgent appointment today. Daughter is willing to do a VV. Please return call ASAP.

## 2021-07-02 NOTE — TELEPHONE ENCOUNTER
Unfortunately I don't have any openings. With her symptoms she should be examined. She if she is will to see Dorita if possible.

## 2021-07-02 NOTE — PATIENT INSTRUCTIONS
Patient Education        Chronic Kidney Disease: Care Instructions  Your Care Instructions     Chronic kidney disease happens when your kidneys don't work as well as they should. Your kidneys have a few important jobs. They remove waste from your blood. This waste leaves your body in your urine. They also balance your body's fluids and chemicals. When your kidneys don't work well, extra waste and fluid can build up. This can poison the body and sometimes cause death. The most common causes of this disease are diabetes and high blood pressure. In some cases, the disease develops in 2 to 3 months. But it usually develops over many years. If you take medicine and make healthy changes to your lifestyle, you may be able to prevent the disease from getting worse. But if your kidney damage gets worse, you may need dialysis or a kidney transplant. Dialysis uses a machine to filter waste from the blood. A transplant is surgery to give you a healthy kidney from another person. Follow-up care is a key part of your treatment and safety. Be sure to make and go to all appointments, and call your doctor if you are having problems. It's also a good idea to know your test results and keep a list of the medicines you take. How can you care for yourself at home? Treatments and appointments    · Be safe with medicines. Take your medicines exactly as prescribed. Call your doctor if you have any problems with your medicine. You also may take medicine to control your blood pressure or to treat diabetes. Many people who have diabetes take blood pressure medicine.     · If you have diabetes, do your best to keep your blood sugar in your target range. You may do this by eating healthy food and exercising. You may also take medicines.     · Go to your dialysis appointments if you have this treatment.     · Do not take ibuprofen (Advil, Motrin), naproxen (Aleve), or similar medicines, unless your doctor tells you to.  These may make the disease worse.     · Do not take any vitamins, over-the-counter medicines, or herbal products without talking to your doctor first.     · Do not smoke or use other tobacco products. Smoking can reduce blood flow to the kidneys. If you need help quitting, talk to your doctor about stop-smoking programs and medicines. These can increase your chances of quitting for good.     · Do not drink alcohol or use illegal drugs.     · Talk to your doctor about an exercise plan. Exercise helps lower your blood pressure. It also makes you feel better.     · If you have an advance directive, let your doctor know. It may include a living will and a durable power of  for health care. If you don't have one, you may want to prepare one. It lets your doctor and loved ones know your health care wishes if you become unable to speak for yourself. Diet    · Talk to a registered dietitian. He or she can help you make a meal plan that is right for you. Most people with kidney disease need to limit salt (sodium), fluids, and protein. Some also have to limit potassium and phosphorus.     · You may have to give up many foods you like. But try to focus on the fact that this will help you stay healthy for as long as possible.     · If you have a hard time eating enough, talk to your doctor or dietitian about ways to add calories to your diet.     · Your diet may change as your disease changes. See your doctor for regular testing. And work with a dietitian to change your diet as needed. When should you call for help? Call 911 anytime you think you may need emergency care. For example, call if:    · You passed out (lost consciousness).    Call your doctor now or seek immediate medical care if:    · You have less urine than normal or no urine.     · You have trouble urinating or can urinate only very small amounts.     · You are confused or have trouble thinking clearly.     · You feel weaker or more tired than usual.     · You are very thirsty, lightheaded, or dizzy.     · You have nausea and vomiting.     · You have new swelling of your arms or feet, or your swelling is worse.     · You have blood in your urine.     · You have new or worse trouble breathing. Watch closely for changes in your health, and be sure to contact your doctor if:    · You have any problems with your medicine or other treatment. Where can you learn more? Go to https://Concilio NetworkspeTuManitas.PharmaCan Capital. org and sign in to your PowerCard account. Enter N276 in the CrimeReports box to learn more about \"Chronic Kidney Disease: Care Instructions. \"     If you do not have an account, please click on the \"Sign Up Now\" link. Current as of: December 17, 2020               Content Version: 12.9  © 2006-2021 [employer] Care instructions adapted under license by [facility] [place] ([facility]). If you have questions about a medical condition or this instruction, always ask your healthcare professional. [place] [address] any warranty or liability for your use of this information. Patient Education        Diet for Chronic Kidney Disease (Before Dialysis): Care Instructions  Overview  When you have chronic kidney disease, you need to change your diet to avoid foods that make your kidneys worse. You may need to limit salt, fluids, and protein. You also may need to limit minerals such as potassium and phosphorus. A diet for chronic kidney disease takes planning. A dietitian who specializes in kidney disease can help you plan meals that meet your needs. These guidelines are for people who are not on dialysis. Talk with your doctor or dietitian to make sure your diet is right for your condition. Do not change your diet without talking to your doctor or dietitian. Follow-up care is a key part of your treatment and safety. Be sure to make and go to all appointments, and call your doctor if you are having problems.  It's also a good idea to know your test results and keep a list of the medicines you take. How can you care for yourself at home? · Work with your doctor or dietitian to create a food plan. · Do not skip meals or go for many hours without eating. If you do not feel very hungry, try to eat 4 or 5 small meals instead of 1 or 2 big meals. · If you have a hard time eating enough, talk to your doctor or dietitian about ways you can add calories to your diet. · Do not take any vitamins or minerals, supplements, or herbal products without talking to your doctor first.  · Check with your doctor about whether it is safe for you to drink alcohol. To get the right amount of protein  · Ask your doctor or dietitian how much protein you can have each day. Most people with chronic kidney disease need to limit the amount of protein they eat. But you still need some protein to stay healthy. · Include all sources of protein in your daily protein count. Besides meat, poultry, fish, and eggs, protein is found in milk and milk products, beans and nuts, breads, cereals, and vegetables. To limit salt  · Read food labels on cans and food packages. The labels tell you how much sodium is in each serving. Make sure that you look at the serving size. If you eat more than the serving size, you will get more sodium than what is listed on the label. · Do not add salt to your food. · Buy foods that are labeled \"no salt added,\" \"sodium-free,\" or \"low-sodium. \" Foods labeled \"reduced-sodium\" and \"light sodium\" may still have too much sodium. · Limit processed foods, fast food, and restaurant foods. These types of food are very high in sodium. · Avoid salted pretzels, chips, popcorn, and other salted snacks. · Avoid smoked, cured, salted, and canned meat, fish, and poultry. This includes ham, gabriel, hot dogs, and luncheon meats. · You may use lemon, herbs, and spices to flavor your meals.   To limit potassium  · Choose low-potassium fruits such as applesauce, pineapple, grapes, blueberries, and raspberries. · Choose low-potassium vegetables such as lettuce, green beans, cucumber, and radishes. · Choose low-potassium foods such as pasta, noodles, rice, tortillas, and bagels. · Limit or avoid high-potassium foods such as milk, bananas, oranges, cantaloupe, potatoes, spinach, tomatoes, broccoli, and sweet potatoes. · Do not use a salt substitute or lite salt unless your doctor says it is okay. Most salt substitutes and lite salts are high in potassium. To limit phosphorus  · Follow your food plan to know how much milk and milk products you can have. · Limit nuts, peanut butter, seeds, lentils, beans, organ meats, and sardines. · Avoid cola drinks. · Avoid bran breads or bran cereals. If you need to limit fluids  · Know how much fluid you can drink. Every day fill a pitcher with that amount of water. If you drink another fluid (such as coffee) that day, pour an equal amount of water out of the pitcher. · Count foods that are liquid at room temperature as fluids. These include ice, gelatin, ice pops, and ice cream.  Where can you learn more? Go to https://Cardiovascular Provider Resource HoldingspeSmartSky Networks.Unruly Â®. org and sign in to your [employer]ne[employer] account. Enter B802 in the [facility] box to learn more about \"Diet for Chronic Kidney Disease (Before Dialysis): Care Instructions. \"     If you do not have an account, please click on the \"Sign Up Now\" link. Current as of: December 17, 2020               Content Version: 12.9  © 5857-6280 [employer]Luxe. Care instructions adapted under license by [facility] ([facility]). If you have questions about a medical condition or this instruction, always ask your healthcare professional. [place] [address] any warranty or liability for your use of this information. Patient Education        Leg and Ankle Edema: Care Instructions  Your Care Instructions  Swelling in the legs, ankles, and feet is called edema.  It is common after you sit or infection, such as:  ? Increased pain, swelling, warmth, or redness. ? Red streaks or pus. ? A fever. Watch closely for changes in your health, and be sure to contact your doctor if:    · Your swelling is getting worse.     · You have new or worsening pain in your legs.     · You do not get better as expected. Where can you learn more? Go to https://Genable Technologies Ltd.pepicCureSquare.Experiment. org and sign in to your Customized Bartending Solutions account. Enter J135 in the Workfolio box to learn more about \"Leg and Ankle Edema: Care Instructions. \"     If you do not have an account, please click on the \"Sign Up Now\" link. Current as of: October 19, 2020               Content Version: 12.9  © 2006-2021 Healthwise, Incorporated. Care instructions adapted under license by [facility] [address] If you have questions about a medical condition or this instruction, always ask your healthcare professional. [place] [address] any warranty or liability for your use of this information.

## 2021-07-06 ENCOUNTER — TELEPHONE (OUTPATIENT)
Dept: FAMILY MEDICINE CLINIC | Age: 86
End: 2021-07-06

## 2021-07-06 NOTE — TELEPHONE ENCOUNTER
----- Message from Pavel Preciado sent at 7/6/2021  9:27 AM EDT -----  Subject: Message to Provider    QUESTIONS  Information for Provider? Pts daughter called and said her mom was saw at   a different offic Friday and the persons he saw said she needs to get an   echocardiogram. SHe is calling to follow up on that and see if Dr. Cami Garcia   is going to order it or if the other doctor has to.   ---------------------------------------------------------------------------  --------------  [address]  What is the best way for the office to contact you? OK to leave message on   voicemail  Preferred Call Back Phone Number? [phone]  ---------------------------------------------------------------------------  --------------  SCRIPT ANSWERS  Relationship to Patient? Other  Representative Name? Faith Baumann  Is the Representative on the appropriate HIPAA document in Epic?  Yes

## 2021-07-07 DIAGNOSIS — R60.0 EDEMA, LOWER EXTREMITY: Primary | ICD-10-CM

## 2021-07-29 ENCOUNTER — HOSPITAL ENCOUNTER (OUTPATIENT)
Dept: NON INVASIVE DIAGNOSTICS | Age: 86
Discharge: HOME | End: 2021-07-29
Payer: MEDICARE

## 2021-07-29 DIAGNOSIS — R60.0 EDEMA, LOWER EXTREMITY: ICD-10-CM

## 2021-07-29 LAB
LV EF: 60 %
LVEF MODALITY: NORMAL

## 2021-07-29 PROCEDURE — 93306 TTE W/DOPPLER COMPLETE: CPT

## 2022-03-21 ENCOUNTER — TELEMEDICINE (OUTPATIENT)
Dept: FAMILY MEDICINE CLINIC | Age: 87
End: 2022-03-21
Payer: MEDICARE

## 2022-03-21 DIAGNOSIS — Z00.00 MEDICARE ANNUAL WELLNESS VISIT, SUBSEQUENT: Primary | ICD-10-CM

## 2022-03-21 PROCEDURE — G0439 PPPS, SUBSEQ VISIT: HCPCS | Performed by: FAMILY MEDICINE

## 2022-03-21 PROCEDURE — G8484 FLU IMMUNIZE NO ADMIN: HCPCS | Performed by: FAMILY MEDICINE

## 2022-03-21 PROCEDURE — 1123F ACP DISCUSS/DSCN MKR DOCD: CPT | Performed by: FAMILY MEDICINE

## 2022-03-21 PROCEDURE — 4040F PNEUMOC VAC/ADMIN/RCVD: CPT | Performed by: FAMILY MEDICINE

## 2022-03-21 SDOH — ECONOMIC STABILITY: FOOD INSECURITY: WITHIN THE PAST 12 MONTHS, THE FOOD YOU BOUGHT JUST DIDN'T LAST AND YOU DIDN'T HAVE MONEY TO GET MORE.: NEVER TRUE

## 2022-03-21 SDOH — ECONOMIC STABILITY: FOOD INSECURITY: WITHIN THE PAST 12 MONTHS, YOU WORRIED THAT YOUR FOOD WOULD RUN OUT BEFORE YOU GOT MONEY TO BUY MORE.: NEVER TRUE

## 2022-03-21 ASSESSMENT — PATIENT HEALTH QUESTIONNAIRE - PHQ9
SUM OF ALL RESPONSES TO PHQ9 QUESTIONS 1 & 2: 0
2. FEELING DOWN, DEPRESSED OR HOPELESS: 0
SUM OF ALL RESPONSES TO PHQ QUESTIONS 1-9: 0
SUM OF ALL RESPONSES TO PHQ QUESTIONS 1-9: 0
1. LITTLE INTEREST OR PLEASURE IN DOING THINGS: 0
SUM OF ALL RESPONSES TO PHQ QUESTIONS 1-9: 0
SUM OF ALL RESPONSES TO PHQ QUESTIONS 1-9: 0

## 2022-03-21 ASSESSMENT — SOCIAL DETERMINANTS OF HEALTH (SDOH): HOW HARD IS IT FOR YOU TO PAY FOR THE VERY BASICS LIKE FOOD, HOUSING, MEDICAL CARE, AND HEATING?: NOT HARD AT ALL

## 2022-03-21 NOTE — PROGRESS NOTES
Medicare Annual Wellness Visit    Franca Cahtman is here for Medicare AW    Assessment & Plan    Recommendations for Preventive Services Due: see orders and patient instructions/AVS.  Recommended screening schedule for the next 5-10 years is provided to the patient in written form: see Patient Instructions/AVS.     No follow-ups on file. Subjective   The following acute and/or chronic problems were also addressed today:  No Problem    Patient's complete Health Risk Assessment and screening values have been reviewed and are found in Flowsheets. The following problems were reviewed today and where indicated follow up appointments were made and/or referrals ordered.     Positive Risk Factor Screenings with Interventions:             General Health and ACP:  General  In general, how would you say your health is?: Very Good  In the past 7 days, have you experienced any of the following: New or Increased Pain, New or Increased Fatigue, Loneliness, Social Isolation, Stress or Anger?: No  Do you get the social and emotional support that you need?: Yes  Do you have a Living Will?: (!) No (not sure, but used too)    Advance Directives     Power of [address] Will ACP-Advance Directive ACP-Power of     Not on File Not on File Not on File Not on File      General Health Risk Interventions:  · Believes she has a living will    Health Habits/Nutrition:     Physical Activity: Inactive    Days of Exercise per Week: 0 days    Minutes of Exercise per Session: 0 min     Have you lost any weight without trying in the past 3 months?: No     Have you seen the dentist within the past year?: (!) No    Health Habits/Nutrition Interventions:  · Inadequate physical activity:  patient agrees to exercise for at least 150 minutes/week      ADLs:  In the past 7 days, did you need help from others to perform any of the following everyday activities: Eating, dressing, grooming, bathing, toileting, or walking/balance?: No  In the past 7 days, did you need help from others to take care of any of the following: Laundry, housekeeping, banking/finances, shopping, telephone use, food preparation, transportation, or taking medications?: (!) Yes (doesnt drive, daughter asst.)  Select all that apply: (!) Transportation    ADL Interventions:  · Patient declines any further evaluation/treatment for this issue          Objective      Patient-Reported Vitals  No data recorded     No PE       Allergies   Allergen Reactions    Morphine     Sulfa Antibiotics Hives     Prior to Visit Medications    Medication Sig Taking? Authorizing Provider   dorzolamide-timolol (COSOPT) 22.3-6.8 MG/ML ophthalmic solution  Yes Historical Provider, MD   latanoprost (XALATAN) 0.005 % ophthalmic solution  Yes Historical Provider, MD   Elastic Bandages & Supports ([address]) MISC 1 each by Does not apply route daily as needed (20-30mm) 20-30 mm compression Yes Sonido Muse, APRN - CNP   psyllium (KONSYL) 28.3 % PACK Take 1 packet by mouth daily  Yes Historical Provider, MD   Multiple Vitamins-Minerals (MULTIVITAMIN PO) Take by mouth  Yes Historical Provider, MD   LUMIGAN 0.01 % SOLN ophthalmic drops Place 1 drop into both eyes nightly Yes Historical Provider, MD Casiano (Including outside providers/suppliers regularly involved in providing care):   Patient Care Team:  Jojo Borden DO as PCP - General (Family Medicine)  Jojo Borden DO as PCP - [facility] Empaneled Provider    Reviewed and updated this visit:  Allergies  Meds            Chichi Eaton, was evaluated through a synchronous (real-time) audio-video encounter. The patient (or guardian if applicable) is aware that this is a billable service, which includes applicable co-pays. This Virtual Visit was conducted with patient's (and/or legal guardian's) consent.  The visit was conducted pursuant to the emergency declaration under the [facility], [address] waiver authority and the [employer] and [employer] General Act. Patient identification was verified, and a caregiver was present when appropriate. The patient was located at home in a state where the provider was licensed to provide care.

## 2022-03-21 NOTE — PATIENT INSTRUCTIONS
Personalized Preventive Plan for Shanika Hendricks - 3/21/2022  Medicare offers a range of preventive health benefits. Some of the tests and screenings are paid in full while other may be subject to a deductible, co-insurance, and/or copay. Some of these benefits include a comprehensive review of your medical history including lifestyle, illnesses that may run in your family, and various assessments and screenings as appropriate. After reviewing your medical record and screening and assessments performed today your provider may have ordered immunizations, labs, imaging, and/or referrals for you. A list of these orders (if applicable) as well as your Preventive Care list are included within your After Visit Summary for your review. Other Preventive Recommendations:    · A preventive eye exam performed by an eye specialist is recommended every 1-2 years to screen for glaucoma; cataracts, macular degeneration, and other eye disorders. · A preventive dental visit is recommended every 6 months. · Try to get at least 150 minutes of exercise per week or 10,000 steps per day on a pedometer . · Order or download the FREE \"Exercise & Physical Activity: Your Everyday Guide\" from [employer]. Call 2-340.512.2909 or search [employer] online. · You need 6574-3932 mg of calcium and 3970-4718 IU of vitamin D per day. It is possible to meet your calcium requirement with diet alone, but a vitamin D supplement is usually necessary to meet this goal.  · When exposed to the sun, use a sunscreen that protects against both UVA and UVB radiation with an SPF of 30 or greater. Reapply every 2 to 3 hours or after sweating, drying off with a towel, or swimming. · Always wear a seat belt when traveling in a car. Always wear a helmet when riding a bicycle or motorcycle.

## 2022-03-22 ENCOUNTER — TELEPHONE (OUTPATIENT)
Dept: FAMILY MEDICINE CLINIC | Age: 87
End: 2022-03-22

## 2022-03-22 DIAGNOSIS — R53.83 FATIGUE, UNSPECIFIED TYPE: ICD-10-CM

## 2022-03-22 DIAGNOSIS — N18.31 STAGE 3A CHRONIC KIDNEY DISEASE (HCC): Primary | ICD-10-CM

## 2022-03-22 DIAGNOSIS — E78.2 MIXED HYPERLIPIDEMIA: ICD-10-CM

## 2022-03-22 NOTE — TELEPHONE ENCOUNTER
----- Message from Hank Bass sent at 3/22/2022  9:59 AM EDT -----  Subject: Referral Request    QUESTIONS   Reason for referral request? patient seen Dr. Meliton Melgar on virtual visit on   3/21/22 and Dr. Meliton Melgar want patient to have blood work please order blood   work and call daughter James Loo when order are ready   Has the physician seen you for this condition before? No   Preferred Specialist (if applicable)? Do you already have an appointment scheduled? No  Additional Information for Provider?   ---------------------------------------------------------------------------  --------------  CALL BACK INFO  What is the best way for the office to contact you? OK to leave message on   voicemail  Preferred Call Back Phone Number?  [phone]

## 2022-03-28 ENCOUNTER — NURSE ONLY (OUTPATIENT)
Dept: FAMILY MEDICINE CLINIC | Age: 87
End: 2022-03-28
Payer: MEDICARE

## 2022-03-28 DIAGNOSIS — R53.83 FATIGUE, UNSPECIFIED TYPE: ICD-10-CM

## 2022-03-28 DIAGNOSIS — Z87.440 HISTORY OF UTI: Primary | ICD-10-CM

## 2022-03-28 DIAGNOSIS — N18.31 STAGE 3A CHRONIC KIDNEY DISEASE (HCC): ICD-10-CM

## 2022-03-28 DIAGNOSIS — E78.2 MIXED HYPERLIPIDEMIA: ICD-10-CM

## 2022-03-28 DIAGNOSIS — R80.9 PROTEINURIA, UNSPECIFIED TYPE: ICD-10-CM

## 2022-03-28 LAB
A/G RATIO: 1.7 (ref 1.1–2.2)
ALBUMIN SERPL-MCNC: 4 G/DL (ref 3.4–5)
ALP BLD-CCNC: 96 U/L (ref 40–129)
ALT SERPL-CCNC: 22 U/L (ref 10–40)
ANION GAP SERPL CALCULATED.3IONS-SCNC: 9 MMOL/L (ref 3–16)
AST SERPL-CCNC: 19 U/L (ref 15–37)
BILIRUB SERPL-MCNC: 0.4 MG/DL (ref 0–1)
BILIRUBIN, POC: ABNORMAL
BLOOD URINE, POC: ABNORMAL
BUN BLDV-MCNC: 17 MG/DL (ref 7–20)
CALCIUM SERPL-MCNC: 9.7 MG/DL (ref 8.3–10.6)
CHLORIDE BLD-SCNC: 105 MMOL/L (ref 99–110)
CHOLESTEROL, TOTAL: 224 MG/DL (ref 0–199)
CLARITY, POC: ABNORMAL
CO2: 24 MMOL/L (ref 21–32)
COLOR, POC: ABNORMAL
CREAT SERPL-MCNC: 1.2 MG/DL (ref 0.6–1.2)
GFR AFRICAN AMERICAN: 51
GFR NON-AFRICAN AMERICAN: 42
GLUCOSE BLD-MCNC: 111 MG/DL (ref 70–99)
GLUCOSE URINE, POC: ABNORMAL
HCT VFR BLD CALC: 41.1 % (ref 36–48)
HDLC SERPL-MCNC: 56 MG/DL (ref 40–60)
HEMOGLOBIN: 13.3 G/DL (ref 12–16)
KETONES, POC: ABNORMAL
LDL CHOLESTEROL CALCULATED: 143 MG/DL
LEUKOCYTE EST, POC: ABNORMAL
MCH RBC QN AUTO: 26.9 PG (ref 26–34)
MCHC RBC AUTO-ENTMCNC: 32.3 G/DL (ref 31–36)
MCV RBC AUTO: 83.2 FL (ref 80–100)
NITRITE, POC: ABNORMAL
PDW BLD-RTO: 15.3 % (ref 12.4–15.4)
PH, POC: 5.5
PLATELET # BLD: 221 K/UL (ref 135–450)
PMV BLD AUTO: 9.4 FL (ref 5–10.5)
POTASSIUM SERPL-SCNC: 5.1 MMOL/L (ref 3.5–5.1)
PROTEIN, POC: ABNORMAL
RBC # BLD: 4.94 M/UL (ref 4–5.2)
SODIUM BLD-SCNC: 138 MMOL/L (ref 136–145)
SPECIFIC GRAVITY, POC: >=1.03
T4 FREE: 1.1 NG/DL (ref 0.9–1.8)
TOTAL PROTEIN: 6.4 G/DL (ref 6.4–8.2)
TRIGL SERPL-MCNC: 124 MG/DL (ref 0–150)
TSH REFLEX FT4: 4.98 UIU/ML (ref 0.27–4.2)
UROBILINOGEN, POC: 0.2
VITAMIN D 25-HYDROXY: 24.9 NG/ML
VLDLC SERPL CALC-MCNC: 25 MG/DL
WBC # BLD: 7.4 K/UL (ref 4–11)

## 2022-03-28 PROCEDURE — 36415 COLL VENOUS BLD VENIPUNCTURE: CPT | Performed by: FAMILY MEDICINE

## 2022-03-28 PROCEDURE — 81002 URINALYSIS NONAUTO W/O SCOPE: CPT | Performed by: FAMILY MEDICINE

## 2022-03-29 LAB — URINE CULTURE, ROUTINE: NORMAL

## 2022-03-31 ENCOUNTER — TELEPHONE (OUTPATIENT)
Dept: FAMILY MEDICINE CLINIC | Age: 87
End: 2022-03-31

## 2022-03-31 ENCOUNTER — OFFICE VISIT (OUTPATIENT)
Dept: FAMILY MEDICINE CLINIC | Age: 87
End: 2022-03-31
Payer: MEDICARE

## 2022-03-31 VITALS
DIASTOLIC BLOOD PRESSURE: 64 MMHG | BODY MASS INDEX: 25.86 KG/M2 | HEART RATE: 63 BPM | SYSTOLIC BLOOD PRESSURE: 126 MMHG | WEIGHT: 146 LBS | OXYGEN SATURATION: 98 %

## 2022-03-31 DIAGNOSIS — N18.31 STAGE 3A CHRONIC KIDNEY DISEASE (HCC): ICD-10-CM

## 2022-03-31 DIAGNOSIS — N64.4 BREAST PAIN, RIGHT: Primary | ICD-10-CM

## 2022-03-31 DIAGNOSIS — N63.10 BREAST MASS, RIGHT: ICD-10-CM

## 2022-03-31 PROCEDURE — G8427 DOCREV CUR MEDS BY ELIG CLIN: HCPCS | Performed by: FAMILY MEDICINE

## 2022-03-31 PROCEDURE — G8417 CALC BMI ABV UP PARAM F/U: HCPCS | Performed by: FAMILY MEDICINE

## 2022-03-31 PROCEDURE — 1090F PRES/ABSN URINE INCON ASSESS: CPT | Performed by: FAMILY MEDICINE

## 2022-03-31 PROCEDURE — G8484 FLU IMMUNIZE NO ADMIN: HCPCS | Performed by: FAMILY MEDICINE

## 2022-03-31 PROCEDURE — 1036F TOBACCO NON-USER: CPT | Performed by: FAMILY MEDICINE

## 2022-03-31 PROCEDURE — 1123F ACP DISCUSS/DSCN MKR DOCD: CPT | Performed by: FAMILY MEDICINE

## 2022-03-31 PROCEDURE — 4040F PNEUMOC VAC/ADMIN/RCVD: CPT | Performed by: FAMILY MEDICINE

## 2022-03-31 PROCEDURE — 99214 OFFICE O/P EST MOD 30 MIN: CPT | Performed by: FAMILY MEDICINE

## 2022-03-31 RX ORDER — ERGOCALCIFEROL 1.25 MG/1
50000 CAPSULE ORAL WEEKLY
Qty: 12 CAPSULE | Refills: 0 | Status: ON HOLD | OUTPATIENT
Start: 2022-03-31 | End: 2022-07-10

## 2022-03-31 ASSESSMENT — ENCOUNTER SYMPTOMS
FACIAL SWELLING: 0
SORE THROAT: 0
SINUS PRESSURE: 0
COUGH: 0
RHINORRHEA: 0
NAUSEA: 0
VOMITING: 0
ABDOMINAL PAIN: 0
SHORTNESS OF BREATH: 0
DIARRHEA: 0

## 2022-03-31 NOTE — PROGRESS NOTES
3/31/2022     Juan Jose Curtis (:  1933) is a 80 y.o. female, here for evaluation of the following medical concerns:    HPI     Patient presented with her daughter to discuss ongoing issue. Right breast tenderness, patient feels lump/ mass  arm swelling, three weeks, noticed a lump three nights ago? Patient doesn't have history of breast cancer, no weight loss, no night sweats, denied random fevers. Vitamin d def-  Needs refills today.       chronic renal disease- stable, went over labs. Today, denied chest pain, sob, n, v, or diarrhea.         Review of Systems   Constitutional: Negative for activity change, fatigue, fever and unexpected weight change. HENT: Negative for congestion, ear pain, facial swelling, rhinorrhea, sinus pressure and sore throat. Respiratory: Negative for cough and shortness of breath. Cardiovascular: Negative for chest pain and leg swelling. Gastrointestinal: Negative for abdominal pain, diarrhea, nausea and vomiting. Musculoskeletal: Positive for myalgias. Negative for arthralgias. Skin: Positive for color change. Negative for rash. Neurological: Negative for dizziness and headaches. Psychiatric/Behavioral: Negative for dysphoric mood. The patient is not nervous/anxious. Prior to Visit Medications    Medication Sig Taking? Authorizing Provider   vitamin D (ERGOCALCIFEROL) 1.25 MG (26988 UT) CAPS capsule Take 1 capsule by mouth once a week  Patient taking differently: Take 50,000 Units by mouth once a week Hasn't started it yet.  Yes Erendira Alonso,    dorzolamide-timolol (COSOPT) 22.3-6.8 MG/ML ophthalmic solution  Yes Historical Provider, MD   latanoprost (XALATAN) 0.005 % ophthalmic solution  Yes Historical Provider, MD   Elastic Bandages & Supports ([address]) MISC 1 each by Does not apply route daily as needed (20-30mm) 20-30 mm compression Yes MARGARITO Cabral - CNP   psyllium (KONSYL) 28.3 % PACK Take 1 packet by mouth daily  Yes Historical Provider, MD   Multiple Vitamins-Minerals (MULTIVITAMIN PO) Take by mouth  Yes Historical Provider, MD        Social History     Tobacco Use    Smoking status: Never Smoker    Smokeless tobacco: Never Used   Substance Use Topics    Alcohol use: No        Vitals:    03/31/22 1327   BP: 126/64   Pulse: 63   SpO2: 98%   Weight: 146 lb (66.2 kg)     Estimated body mass index is 25.86 kg/m² as calculated from the following:    Height as of 7/2/21: 5' 3\" (1.6 m). Weight as of this encounter: 146 lb (66.2 kg). Physical Exam  Vitals and nursing note reviewed. Constitutional:       Appearance: She is well-developed. HENT:      Head: Normocephalic and atraumatic. Right Ear: External ear normal.      Left Ear: External ear normal.   Cardiovascular:      Rate and Rhythm: Normal rate and regular rhythm. Heart sounds: Normal heart sounds. Pulmonary:      Effort: Pulmonary effort is normal.      Breath sounds: Normal breath sounds. Chest:       Abdominal:      General: Bowel sounds are normal.      Palpations: Abdomen is soft. Tenderness: There is no abdominal tenderness. Skin:     General: Skin is warm and dry. Neurological:      Mental Status: She is alert and oriented to person, place, and time. Psychiatric:         Behavior: Behavior normal.         ASSESSMENT/PLAN:  1. Breast pain, right  Need mammogram and ultrasound  Questions answered  Reassured the patient   - [facility] DIGITAL SCREEN W OR WO CAD BILATERAL; Future  - US BREAST COMPLETE RIGHT; Future  - [facility] DIGITAL DIAGNOSTIC W OR WO CAD BILATERAL; Future    2. Stage 3a chronic kidney disease ([facility] [address]  Stable  Continue with medication  Keep appointments with specialist.   Anita Melendez questions    3. Breast mass, right  Referral to breast surgeon    - [facility] DIGITAL SCREEN W OR WO CAD BILATERAL; Future  - US BREAST COMPLETE RIGHT; Future  - [facility] DIGITAL DIAGNOSTIC W OR WO CAD BILATERAL;  Future  - [facility] - Erickson Perkins MD, Breast Surgery, [facility]      Return in about 3 months (around 6/30/2022).

## 2022-03-31 NOTE — TELEPHONE ENCOUNTER
Please change orders. ...     Pt seen 3/31/2022 today an order was submitted   for a US Breast screening, however, insurance will only cover diagnostic,   please resubmit a revised order, Pt is requesting an expedite so that she   can make the appt.  ---------------------------------------------------------------------------

## 2022-03-31 NOTE — TELEPHONE ENCOUNTER
----- Message from Rj Anna sent at 7/94/9601  3:33 PM EDT -----  Subject: Message to Provider    QUESTIONS  Information for Provider? Pt seen 3/31/2022 today an order was submitted   for a US Breast screening, however, insurance will only cover diagnostic,   please resubmit a revised order, Pt is requesting an expedite so that she   can make the appt.  ---------------------------------------------------------------------------  --------------  [address]  What is the best way for the office to contact you? OK to leave message on   voicemail  Preferred Call Back Phone Number? [phone]  ---------------------------------------------------------------------------  --------------  SCRIPT ANSWERS  Relationship to Patient? Guardian  Representative Name? Brittany Kapoor daughter  Is the Representative on the appropriate HIPAA document in Epic?  Yes

## 2022-04-01 NOTE — TELEPHONE ENCOUNTER
Patient's daughter called to inform JODI Garcia that [facility] cannot see the orders for diagnostic mammogram and right breast ultrasound in the patient's chart.  Please fax orders to  [phone] and notify patient's daughter so that she can call to schedule test

## 2022-04-03 ENCOUNTER — APPOINTMENT (OUTPATIENT)
Dept: GENERAL RADIOLOGY | Age: 87
End: 2022-04-03
Payer: MEDICARE

## 2022-04-03 ENCOUNTER — APPOINTMENT (OUTPATIENT)
Dept: CT IMAGING | Age: 87
End: 2022-04-03
Payer: MEDICARE

## 2022-04-03 ENCOUNTER — HOSPITAL ENCOUNTER (EMERGENCY)
Age: 87
Discharge: HOME | End: 2022-04-03
Attending: EMERGENCY MEDICINE
Payer: MEDICARE

## 2022-04-03 VITALS
HEIGHT: 63 IN | RESPIRATION RATE: 15 BRPM | SYSTOLIC BLOOD PRESSURE: 144 MMHG | BODY MASS INDEX: 25.68 KG/M2 | HEART RATE: 67 BPM | DIASTOLIC BLOOD PRESSURE: 56 MMHG | WEIGHT: 144.9 LBS | OXYGEN SATURATION: 96 % | TEMPERATURE: 97.8 F

## 2022-04-03 DIAGNOSIS — N63.10 BREAST MASS, RIGHT: ICD-10-CM

## 2022-04-03 DIAGNOSIS — R07.9 CHEST PAIN, UNSPECIFIED TYPE: Primary | ICD-10-CM

## 2022-04-03 LAB
A/G RATIO: 1.4 (ref 1.1–2.2)
ALBUMIN SERPL-MCNC: 4 G/DL (ref 3.4–5)
ALP BLD-CCNC: 94 U/L (ref 40–129)
ALT SERPL-CCNC: 25 U/L (ref 10–40)
ANION GAP SERPL CALCULATED.3IONS-SCNC: 13 MMOL/L (ref 3–16)
AST SERPL-CCNC: 19 U/L (ref 15–37)
BANDED NEUTROPHILS RELATIVE PERCENT: 2 % (ref 0–7)
BASOPHILS ABSOLUTE: 0.1 K/UL (ref 0–0.2)
BASOPHILS RELATIVE PERCENT: 1 %
BILIRUB SERPL-MCNC: 0.4 MG/DL (ref 0–1)
BUN BLDV-MCNC: 22 MG/DL (ref 7–20)
CALCIUM SERPL-MCNC: 9.4 MG/DL (ref 8.3–10.6)
CHLORIDE BLD-SCNC: 106 MMOL/L (ref 99–110)
CO2: 20 MMOL/L (ref 21–32)
CREAT SERPL-MCNC: 1.3 MG/DL (ref 0.6–1.2)
EOSINOPHILS ABSOLUTE: 1.3 K/UL (ref 0–0.6)
EOSINOPHILS RELATIVE PERCENT: 15 %
GFR AFRICAN AMERICAN: 47
GFR NON-AFRICAN AMERICAN: 39
GLUCOSE BLD-MCNC: 137 MG/DL (ref 70–99)
HCT VFR BLD CALC: 42.3 % (ref 36–48)
HEMOGLOBIN: 13.6 G/DL (ref 12–16)
LIPASE: 39 U/L (ref 13–60)
LYMPHOCYTES ABSOLUTE: 2.5 K/UL (ref 1–5.1)
LYMPHOCYTES RELATIVE PERCENT: 28 %
MCH RBC QN AUTO: 26.1 PG (ref 26–34)
MCHC RBC AUTO-ENTMCNC: 32.1 G/DL (ref 31–36)
MCV RBC AUTO: 81.3 FL (ref 80–100)
MONOCYTES ABSOLUTE: 0.5 K/UL (ref 0–1.3)
MONOCYTES RELATIVE PERCENT: 6 %
NEUTROPHILS ABSOLUTE: 4.5 K/UL (ref 1.7–7.7)
NEUTROPHILS RELATIVE PERCENT: 48 %
PDW BLD-RTO: 14.2 % (ref 12.4–15.4)
PLATELET # BLD: 269 K/UL (ref 135–450)
PMV BLD AUTO: 9.9 FL (ref 5–10.5)
POTASSIUM REFLEX MAGNESIUM: 4.3 MMOL/L (ref 3.5–5.1)
RBC # BLD: 5.2 M/UL (ref 4–5.2)
SLIDE REVIEW: ABNORMAL
SODIUM BLD-SCNC: 139 MMOL/L (ref 136–145)
TOTAL PROTEIN: 6.8 G/DL (ref 6.4–8.2)
TROPONIN: <0.01 NG/ML
TROPONIN: <0.01 NG/ML
WBC # BLD: 8.9 K/UL (ref 4–11)

## 2022-04-03 PROCEDURE — 80053 COMPREHEN METABOLIC PANEL: CPT

## 2022-04-03 PROCEDURE — 71250 CT THORAX DX C-: CPT

## 2022-04-03 PROCEDURE — 6370000000 HC RX 637 (ALT 250 FOR IP): Performed by: EMERGENCY MEDICINE

## 2022-04-03 PROCEDURE — 36415 COLL VENOUS BLD VENIPUNCTURE: CPT

## 2022-04-03 PROCEDURE — 83690 ASSAY OF LIPASE: CPT

## 2022-04-03 PROCEDURE — 85025 COMPLETE CBC W/AUTO DIFF WBC: CPT

## 2022-04-03 PROCEDURE — 99284 EMERGENCY DEPT VISIT MOD MDM: CPT

## 2022-04-03 PROCEDURE — 93005 ELECTROCARDIOGRAM TRACING: CPT

## 2022-04-03 PROCEDURE — 71045 X-RAY EXAM CHEST 1 VIEW: CPT

## 2022-04-03 PROCEDURE — 84484 ASSAY OF TROPONIN QUANT: CPT

## 2022-04-03 RX ORDER — ASPIRIN 81 MG/1
324 TABLET, CHEWABLE ORAL ONCE
Status: COMPLETED | OUTPATIENT
Start: 2022-04-03 | End: 2022-04-03

## 2022-04-03 RX ADMIN — ASPIRIN 81 MG 324 MG: 81 TABLET ORAL at 15:02

## 2022-04-03 ASSESSMENT — PAIN - FUNCTIONAL ASSESSMENT: PAIN_FUNCTIONAL_ASSESSMENT: 0-10

## 2022-04-03 ASSESSMENT — HEART SCORE: ECG: 0

## 2022-04-03 ASSESSMENT — ENCOUNTER SYMPTOMS
RHINORRHEA: 0
NAUSEA: 1
EYE REDNESS: 0
SHORTNESS OF BREATH: 1
SORE THROAT: 0
ABDOMINAL PAIN: 0

## 2022-04-03 ASSESSMENT — PAIN SCALES - GENERAL
PAINLEVEL_OUTOF10: 0

## 2022-04-03 NOTE — ED NOTES
Patient in CT. Will redraw second trop when she returns. Family remains in room.      Chadwick Bearden RN  04/03/22 1796

## 2022-04-03 NOTE — ED PROVIDER NOTES
[facility]  eMERGENCY dEPARTMENT eNCOUnter      Pt Name: Aleyda Mayo  MRN: [id number]  [place] 12/18/1933  Date of evaluation: 4/3/2022  Provider: Chari Palm MD    CHIEF COMPLAINT       Chief Complaint   Patient presents with    Chest Pain     Intermittent chest pain for approximately 1 week. Denies chest pain at this time. States will sometimes feel it in her back at times. Daughter would like her checked out. HISTORY OF PRESENT ILLNESS   (Location/Symptom, Timing/Onset,Context/Setting, Quality, Duration, Modifying Factors, Severity)  Note limiting factors. Aleyad Mayo is a 80 y.o. female who presents to the emergency department with chest discomfort. The patient states for at least 2 weeks she has been getting some chest discomfort. She describes as a discomfort in the center of her chest with occasional pain in between her shoulder blades. It lasts a couple minutes at a time. Is associated with nausea. No vomiting. She states that she has baseline shortness of breath because she is 80years old, but she is noticed no difference in her shortness of breath. No associated diaphoresis. No fever chills or cough or belly pain. Patient's daughter states that the patient found a mass in her breast and she went to her physician who confirmed that they felt a mass in her breast and she has been scheduled for an outpatient ultrasound of that area but that is yet to be performed. HPI    NursingNotes were reviewed. REVIEW OF SYSTEMS    (2-9 systems for level 4, 10 or more for level 5)     Review of Systems   Constitutional: Negative for fever. HENT: Negative for rhinorrhea and sore throat. Eyes: Negative for redness. Respiratory: Positive for shortness of breath. Cardiovascular: Positive for chest pain. Gastrointestinal: Positive for nausea. Negative for abdominal pain. Genitourinary: Negative for flank pain.    Musculoskeletal: Negative for joint swelling. Skin: Negative for rash. Neurological: Negative for headaches. Hematological: Negative for adenopathy. Psychiatric/Behavioral: Negative for confusion. Except as noted above the remainder of the review of systems was reviewed and negative. PAST MEDICAL HISTORY     Past Medical History:   Diagnosis Date    Diverticulitis     Glaucoma     Macular degeneration          SURGICALHISTORY       Past Surgical History:   Procedure Laterality Date    BLADDER SUSPENSION      COLONOSCOPY  2007         CURRENT MEDICATIONS       Discharge Medication List as of 4/3/2022  7:32 PM      CONTINUE these medications which have NOT CHANGED    Details   vitamin D (ERGOCALCIFEROL) 1.25 MG (60397 UT) CAPS capsule Take 1 capsule by mouth once a week, Disp-12 capsule, R-0Normal      dorzolamide-timolol (COSOPT) 22.3-6.8 MG/ML ophthalmic solution Historical Med      latanoprost (XALATAN) 0.005 % ophthalmic solution Historical Med      Elastic Bandages & Supports (MEDICAL COMPRESSION STOCKINGS) MISC DAILY PRN Starting Fri 7/2/2021, Disp-1 each, R-2, [id number] mm compression      psyllium (KONSYL) 28.3 % PACK Take 1 packet by mouth daily Historical Med      Multiple Vitamins-Minerals (MULTIVITAMIN PO) Take by mouth Historical Med             ALLERGIES     Morphine and Sulfa antibiotics    FAMILY HISTORY     No family history on file. SOCIAL HISTORY       Social History     Socioeconomic History    Marital status:       Spouse name: Not on file    Number of children: Not on file    Years of education: Not on file    Highest education level: Not on file   Occupational History    Not on file   Tobacco Use    Smoking status: Never Smoker    Smokeless tobacco: Never Used   Substance and Sexual Activity    Alcohol use: No    Drug use: No    Sexual activity: Not on file   Other Topics Concern    Not on file   Social History Narrative    Not on file     Social Determinants of Health     Financial Resource Strain: Low Risk     Difficulty of Paying Living Expenses: Not hard at all   Food Insecurity: No Food Insecurity    Worried About Running Out of Food in the Last Year: Never true    Ran of Food in the Last Year: Never true   Transportation Needs:     Lack of Transportation (Medical): Not on file    Lack of Transportation (Non-Medical): Not on file   Physical Activity: Inactive    Days of Exercise per Week: 0 days    Minutes of Exercise per Session: 0 min   Stress:     Feeling of Stress : Not on file   Social Connections:     Frequency of Communication with Friends and Family: Not on file    Frequency of Social Gatherings with Friends and Family: Not on file    Attends Denominational Services: Not on file    Active Member of [address] [place] [address] [employer] or Organizations: Not on file    Attends Club or Organization Meetings: Not on file    Marital Status: Not on file   Intimate Partner Violence:     Fear of Current or Ex-Partner: Not on file    Emotionally Abused: Not on file    Physically Abused: Not on file    Sexually Abused: Not on file   Housing Stability:     Unable to Pay for Housing in the Last Year: Not on file    Number of [place] in the Last Year: Not on file    Unstable Housing in the Last Year: Not on file       SCREENINGS      Heart Score for chest pain patients  History: Slightly Suspicious  ECG: Normal  Patient Age: > 65 years  *Risk factors for Atherosclerotic disease: Positive family History  Risk Factors: 1 or 2 risk factors  Troponin: < 1X normal limit  Heart Score Total: 3      PHYSICAL EXAM    (up to 7 for level 4, 8 or more for level 5)     ED Triage Vitals   BP Temp Temp Source Pulse Resp SpO2 Height Weight   04/03/22 1455 04/03/22 1455 04/03/22 1451 04/03/22 1455 04/03/22 1455 04/03/22 1455 04/03/22 1455 04/03/22 1455   (!) 162/66 97.8 °F (36.6 °C) Oral 65 17 98 % 5' 3\" (1.6 m) 144 lb 14.4 oz (65.7 kg)       Physical Exam  Vitals and nursing note reviewed. Constitutional:       Appearance: She is well-developed. She is not diaphoretic. HENT:      Head: Normocephalic and atraumatic. Mouth/Throat:      Mouth: Mucous membranes are moist.   Eyes:      General:         Right eye: No discharge. Left eye: No discharge. Conjunctiva/sclera: Conjunctivae normal.   Cardiovascular:      Rate and Rhythm: Normal rate. Pulses: Normal pulses. Heart sounds: No murmur heard. Pulmonary:      Effort: Pulmonary effort is normal. No respiratory distress. Breath sounds: Normal breath sounds. No wheezing, rhonchi or rales. Abdominal:      Palpations: Abdomen is soft. Tenderness: There is no abdominal tenderness. There is no guarding or rebound. Musculoskeletal:         General: Normal range of motion. Cervical back: Neck supple. Comments: Trace edema bilaterally. Skin:     General: Skin is warm and dry. Capillary Refill: Capillary refill takes less than 2 seconds. Neurological:      Mental Status: She is alert and oriented to person, place, and time.    Psychiatric:         Behavior: Behavior normal.         DIAGNOSTIC RESULTS     EKG: All EKG's are interpreted by the Emergency Department Physician who either signs or Co-signsthis chart in the absence of a cardiologist.  The Ekg interpreted by me shows  normal sinus rhythm with a rate of 66  Axis is   Normal  QTc is  within an acceptable range  Low voltage  ST Segments: normal  No significant change from prior EKG dated 9/14/17            RADIOLOGY:   Non-plain filmimages such as CT, Ultrasound and MRI are read by the radiologist. Plain radiographic images are visualized and preliminarily interpreted by the emergency physician with the below findings:        Interpretation per the Radiologist below, if available at the time ofthis note:    CT CHEST WO CONTRAST   Final Result   Diffuse mild skin thickening throughout the right breast anteriorly with   moderate asymmetric parenchymal irregularity seen in the breast centrally and   extending superior laterally which could be due to prior surgery or possible   inflammatory or neoplastic changes in the breast.  There are multiple small   lymph nodes scattered in the right axilla which could be inflammatory or   neoplastic in etiology. Recommend surgical follow-up and mammographic   correlation of the right breast.      Moderate coronary artery calcifications. Perihilar cysts in both kidneys. Mild chronic obstructive lung changes with mild linear scarring or discoid   atelectasis along the lung bases. No infiltrate or effusion is seen. Tiny 4-5 mm subpleural nodule left lower lobe. Recommend follow-up      Fleischner Society guidelines for follow-up and management of incidentally   detected pulmonary nodules:      Single Solid Nodule:      Nodule size less than 6 mm   In a low-risk patient, no routine follow-up. In a high-risk patient, optional CT at 12 months. Nodule size equals 6-8 mm   In a low-risk patient, CT at 6-12 months, then consider CT at 18-24 months. In a high-risk patient, CT at 6-12 months, then CT at 18-24 months. Nodule size greater than 8 mm         In a low-risk patient, consider CT at 3 months, PET/CT, or tissue sampling. In a high-risk patient, consider CT at 3 months, PET/CT, or tissue sampling.      - Low risk patients include individuals with minimal or absent history of   smoking and other known risk factors. - High risk patients include individuals with a history or smoking or known   risk factors. Radiology 2017 http://pubs. rsna.org/doi/full/10.1148/radiol. 3802029699         XR CHEST PORTABLE   Final Result   Trace left pleural effusion with likely overlying atelectasis.                ED BEDSIDE ULTRASOUND:   Performed by ED Physician - none    LABS:  Labs Reviewed   CBC WITH AUTO DIFFERENTIAL - Abnormal; Notable for the following components:       Result Value Eosinophils Absolute 1.3 (*)     All other components within normal limits   COMPREHENSIVE METABOLIC PANEL W/ REFLEX TO MG FOR LOW K - Abnormal; Notable for the following components:    CO2 20 (*)     Glucose 137 (*)     BUN 22 (*)     CREATININE 1.3 (*)     GFR Non- 39 (*)     GFR  47 (*)     All other components within normal limits   LIPASE   TROPONIN   TROPONIN       All other labs were within normal range or not returned as of this dictation. EMERGENCY DEPARTMENT COURSE and DIFFERENTIAL DIAGNOSIS/MDM:   Vitals:    Vitals:    04/03/22 1730 04/03/22 1800 04/03/22 1830 04/03/22 1900   BP: (!) 139/56 (!) 125/59 (!) 153/70 (!) 144/56   Pulse: 64 66 76 67   Resp: 17 18 28 15   Temp:       TempSrc:       SpO2: 97% 98% 95% 96%   Weight:       Height:               MDM  Number of Diagnoses or Management Options  Breast mass, right  Chest pain, unspecified type  Diagnosis management comments: DDX: Non-ST elevation MI, unstable angina, pleural effusion, pericardial effusion, PE, aortic dissection, other    Given that the symptoms have been ongoing for several weeks I do not suspect aortic dissection. Furthermore the patient's blood pressure has been not significant she has normal pulses. Is not ripping or tearing pain. The patient's chest complaints are atypical for cardiac ischemia. Her only risk factor is family history it may be some hypertension. Her heart score is at 3. She has had 2 - troponins. Her EKG showed sinus rhythm without ST changes. Laboratory work-up showed a troponin negative x2. Her lipase is normal so I do not suspect pancreatitis. Her CBC is normal.  And her CMP is remarkable for some mild renal insufficiency. Glucose 137. And a decreased GFR. The patient was given fluids in the ED. Her chest x-ray showed a trace left pleural effusion with likely overlying atelectasis.   Because the patient reports a history of a chest mass and some back discomfort I decided to perform a noncontrasted CT to make sure that there is no evidence of bony abnormalities that might be eliciting the patient's pain. While the radiologist sees changes in the right breast and they do not see any bony abnormalities or any pneumonia or pneumothorax. This time the patient is not tachypneic or having any more increased dyspnea with exertion. She is not tachycardic. I felt PE was less likely given her renal insufficiency and probable need for contrasted studies in the near future to work-up of probable malignancy that the I would prefer to not give her contrast at this time. I had a very lengthy discussion with the patient and her daughter I was present. I went over all of these things with them. At this time the patient feels comfortable being discharged home. She does according to the heart score that her chance of a major adverse cardiac event is somewhere between 1 to 1.8%. She is aware of the mass in her breast and has an outpatient ultrasound scheduled. I have asked him to follow-up with the patient's primary care provider by telephone so that she can have close follow-up as an outpatient with Dr. Chava Pinedo. Return precautions given. CRITICAL CARE TIME   Total Critical Care time was 0 minutes, excluding separately reportable procedures. There was a high probability of clinically significant/life threatening deterioration in the patient's condition which required my urgent intervention. CONSULTS:  None    PROCEDURES:  Unless otherwise noted below, none     Procedures    FINAL IMPRESSION      1. Chest pain, unspecified type    2.  Breast mass, right          DISPOSITION/PLAN   DISPOSITION Decision To Discharge 04/03/2022 07:30:14 PM      PATIENT REFERRED TO:  Genesis Hannon DO  [address] [place] [address]  [phone]    Schedule an appointment as soon as possible for a visit in 3 days        DISCHARGE MEDICATIONS:  Discharge Medication List as of 4/3/2022  7:32 PM             (Please note that portions of this note were completed with a voice recognition program.Efforts were made to edit the dictations but occasionally words are mis-transcribed.)    Kar Greene MD (electronically signed)  Attending Emergency Physician          Kar Greene MD  04/04/22 5516

## 2022-04-04 ENCOUNTER — TELEPHONE (OUTPATIENT)
Dept: FAMILY MEDICINE CLINIC | Age: 87
End: 2022-04-04

## 2022-04-04 ENCOUNTER — CARE COORDINATION (OUTPATIENT)
Dept: CARE COORDINATION | Age: 87
End: 2022-04-04

## 2022-04-04 LAB
EKG ATRIAL RATE: 66 BPM
EKG DIAGNOSIS: NORMAL
EKG P AXIS: 85 DEGREES
EKG P-R INTERVAL: 158 MS
EKG Q-T INTERVAL: 408 MS
EKG QRS DURATION: 68 MS
EKG QTC CALCULATION (BAZETT): 427 MS
EKG R AXIS: 52 DEGREES
EKG T AXIS: 61 DEGREES
EKG VENTRICULAR RATE: 66 BPM

## 2022-04-04 PROCEDURE — 93010 ELECTROCARDIOGRAM REPORT: CPT | Performed by: INTERNAL MEDICINE

## 2022-04-04 ASSESSMENT — ENCOUNTER SYMPTOMS: COLOR CHANGE: 1

## 2022-04-04 NOTE — CARE COORDINATION
CM Risk Score: 1  Charlson 10 Year Mortality Risk Score: 98%     Out reach call placed to pt, who was currently unavailable. spoke with pt daughter Ewa Daniels. Introduced self and reason for call. Daughter reports pt is doing about the same , thinks her pain is stemming from breast issues as opposed to her heart. She spoke with Chinmay Rehman in office and discussed that they are going to follow up on breast issues prior to stress testing and further cardiac evaluation has appointment tomorrow for mammogram and U/s and with breast surgeon next week  Agreeable to ACM calling next week to follow up.  Will try to speak with pt regarding care coordination at that time

## 2022-04-04 NOTE — TELEPHONE ENCOUNTER
Please call the patient's daughter and let her know I'm not in the office. The CT couldn't rule this out but it doesn't mean she has breast cancer. If she is having problems getting the imaging let me know and we can try to call to get this urgently. Also, she needs to make her appointment with the surgeon. Again, we can call to expedite this.

## 2022-04-04 NOTE — TELEPHONE ENCOUNTER
Patient was seen in ER yesterday for chest pain and cardiac work up. Daughter states she is also being worked up for breast cancer. Daughter is wanting to talk to Dr. Sherlyn Velasquez directly about her ER visit. Please return call.

## 2022-04-04 NOTE — TELEPHONE ENCOUNTER
Talked to daughter, Isaiah Brittle scheduled for 4/5/2022, Lazara Tena scheduled for 04/07/2022. Just wanted to call us to keep us in the loop.     Advised to follow up with us

## 2022-04-05 ENCOUNTER — HOSPITAL ENCOUNTER (OUTPATIENT)
Dept: MAMMOGRAPHY | Age: 87
Discharge: HOME | End: 2022-04-05
Payer: MEDICARE

## 2022-04-05 ENCOUNTER — HOSPITAL ENCOUNTER (OUTPATIENT)
Dept: ULTRASOUND IMAGING | Age: 87
Discharge: HOME | End: 2022-04-05
Payer: MEDICARE

## 2022-04-05 VITALS — WEIGHT: 146 LBS | BODY MASS INDEX: 25.87 KG/M2 | HEIGHT: 63 IN

## 2022-04-05 DIAGNOSIS — N63.10 BREAST MASS, RIGHT: ICD-10-CM

## 2022-04-05 DIAGNOSIS — N64.4 BREAST PAIN, RIGHT: ICD-10-CM

## 2022-04-05 PROCEDURE — 76642 ULTRASOUND BREAST LIMITED: CPT

## 2022-04-05 PROCEDURE — G0279 TOMOSYNTHESIS, MAMMO: HCPCS

## 2022-04-06 ENCOUNTER — HOSPITAL ENCOUNTER (OUTPATIENT)
Dept: ULTRASOUND IMAGING | Age: 87
Discharge: HOME | End: 2022-04-06
Payer: MEDICARE

## 2022-04-06 ENCOUNTER — HOSPITAL ENCOUNTER (OUTPATIENT)
Dept: MAMMOGRAPHY | Age: 87
Discharge: HOME | End: 2022-04-06
Payer: MEDICARE

## 2022-04-06 DIAGNOSIS — R93.89 ABNORMAL FINDING ON IMAGING: ICD-10-CM

## 2022-04-06 DIAGNOSIS — R92.8 ABNORMAL MAMMOGRAM OF RIGHT BREAST: ICD-10-CM

## 2022-04-06 PROCEDURE — 88360 TUMOR IMMUNOHISTOCHEM/MANUAL: CPT

## 2022-04-06 PROCEDURE — 88305 TISSUE EXAM BY PATHOLOGIST: CPT

## 2022-04-06 PROCEDURE — 88342 IMHCHEM/IMCYTCHM 1ST ANTB: CPT

## 2022-04-06 PROCEDURE — 2709999900 US BREAST BIOPSY W LOC DEVICE 1ST LESION RIGHT

## 2022-04-06 PROCEDURE — 77065 DX MAMMO INCL CAD UNI: CPT

## 2022-04-06 PROCEDURE — 38505 NEEDLE BIOPSY LYMPH NODES: CPT

## 2022-04-07 ENCOUNTER — TELEPHONE (OUTPATIENT)
Dept: SURGERY | Age: 87
End: 2022-04-07

## 2022-04-07 NOTE — TELEPHONE ENCOUNTER
Breast History:  History of Previous Breast Biopsy:no  Self Breast Exams Completed:no  Family History of Breast Cancer:no    Family History of Other Cancers:no   Ashkenazi Sabianism Decent:no  Bra Size: 34C    Gyne History:  : 3,   Para: 3  Age of Menarche: 6 years  Age of Menopause: 44 years   Age of first live Birth: 25 years  History of Hysterectomy / CHLOE-BSO: No  History of OCP's/HRT:No    Family History or personal history of Ovarian Cancer: no

## 2022-04-11 ENCOUNTER — OFFICE VISIT (OUTPATIENT)
Dept: BREAST CENTER | Age: 87
End: 2022-04-11
Payer: MEDICARE

## 2022-04-11 VITALS
SYSTOLIC BLOOD PRESSURE: 131 MMHG | BODY MASS INDEX: 25.51 KG/M2 | HEART RATE: 75 BPM | DIASTOLIC BLOOD PRESSURE: 57 MMHG | WEIGHT: 144 LBS

## 2022-04-11 DIAGNOSIS — C50.411 MALIGNANT NEOPLASM OF UPPER-OUTER QUADRANT OF RIGHT BREAST IN FEMALE, ESTROGEN RECEPTOR NEGATIVE (HCC): Primary | ICD-10-CM

## 2022-04-11 DIAGNOSIS — Z17.1 MALIGNANT NEOPLASM OF UPPER-OUTER QUADRANT OF RIGHT BREAST IN FEMALE, ESTROGEN RECEPTOR NEGATIVE (HCC): Primary | ICD-10-CM

## 2022-04-11 PROCEDURE — 99204 OFFICE O/P NEW MOD 45 MIN: CPT | Performed by: SURGERY

## 2022-04-11 PROCEDURE — G8417 CALC BMI ABV UP PARAM F/U: HCPCS | Performed by: SURGERY

## 2022-04-11 PROCEDURE — 4040F PNEUMOC VAC/ADMIN/RCVD: CPT | Performed by: SURGERY

## 2022-04-11 PROCEDURE — G8427 DOCREV CUR MEDS BY ELIG CLIN: HCPCS | Performed by: SURGERY

## 2022-04-11 PROCEDURE — 1090F PRES/ABSN URINE INCON ASSESS: CPT | Performed by: SURGERY

## 2022-04-11 PROCEDURE — 1123F ACP DISCUSS/DSCN MKR DOCD: CPT | Performed by: SURGERY

## 2022-04-11 PROCEDURE — 1036F TOBACCO NON-USER: CPT | Performed by: SURGERY

## 2022-04-11 NOTE — PROGRESS NOTES
Subjective:      Patient ID: Anh Campbell is a 80 y.o. female. HPI   Chief Complaint   Patient presents with    Surgical Consult     Patient referred by Dr Hannah Cho for right breast lump     Patient presented with pain in her right breast for several weeks and she also recently noticed a right breast mass. Bilateral mammogram and right breast ultrasound 2022 showed a 1.5 cm mass 9:00 right breast, 7 cmfn. On mammogram, the asymmetry and distortion involves the entire upper outer quadrant and extends to the axillary tail. Right axillary ultrasound showed a 1.7 cm axillary node and multiple other nodes in the axilla with cortical thickening. Also noted on ultrasound were at least 4 abnormal masses 12:00, 10 cmfn, some appearing to be intramuscular, the largest 0.9 cm. 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 Here with her daughter. Medically, patient has glaucoma and renal insufficiency, otherwise healthy.     Breast History:  History of Previous Breast Biopsy:no  Self Breast Exams Completed:no  Family History of Breast Cancer:no    Family History of Other Cancers:no   Ashkenazi Druze Decent:no  Bra Size: 34C     Gyne History:  : 3,   Para: 3  Age of Menarche: 6 years  Age of Menopause: 44 years   Age of first live Birth: 25 years  History of Hysterectomy / CHLOE-BSO: No  History of OCP's/HRT:No    Family History or personal history of Ovarian Cancer: no     Past Medical History:   Diagnosis Date    Diverticulitis     Glaucoma     Macular degeneration        Past Surgical History:   Procedure Laterality Date    BLADDER SUSPENSION      COLONOSCOPY      US BREAST NEEDLE BIOPSY RIGHT Right 2022    US BREAST NEEDLE BIOPSY RIGHT 2022 [facility] ULTRASOUND    US LYMPH NODE BIOPSY  2022    US LYMPH NODE BIOPSY 2022 [facility] ULTRASOUND       Current Outpatient Medications   Medication Sig Dispense Refill    vitamin D (ERGOCALCIFEROL) 1.25 MG (68396 UT) CAPS capsule Take 1 capsule by mouth once a week (Patient taking differently: Take 50,000 Units by mouth once a week Hasn't started it yet.) 12 capsule 0    dorzolamide-timolol (COSOPT) 22.3-6.8 MG/ML ophthalmic solution       latanoprost (XALATAN) 0.005 % ophthalmic solution       Elastic Bandages & Supports (MEDICAL COMPRESSION STOCKINGS) MISC 1 each by Does not apply route daily as needed (20-30mm) 20-30 mm compression 1 each 2    psyllium (KONSYL) 28.3 % PACK Take 1 packet by mouth daily       Multiple Vitamins-Minerals (MULTIVITAMIN PO) Take by mouth        No current facility-administered medications for this visit. Social History     Socioeconomic History    Marital status:      Spouse name: Not on file    Number of children: Not on file    Years of education: Not on file    Highest education level: Not on file   Occupational History    Not on file   Tobacco Use    Smoking status: Never Smoker    Smokeless tobacco: Never Used   Substance and Sexual Activity    Alcohol use: No    Drug use: No    Sexual activity: Not on file   Other Topics Concern    Not on file   Social History Narrative    Not on file     Social Determinants of Health     Financial Resource Strain: Low Risk     Difficulty of Paying Living Expenses: Not hard at all   Food Insecurity: No Food Insecurity    Worried About [address] in the Last Year: Never true    [address] in the Last Year: Never true   Transportation Needs:     Lack of Transportation (Medical): Not on file    Lack of Transportation (Non-Medical):  Not on file   Physical Activity: Inactive    Days of Exercise per Week: 0 days    Minutes of Exercise per Session: 0 min   Stress:     Feeling of Stress : Not on file   Social Connections:     Frequency of Communication with Friends and Family: Not on file    Frequency of Social Gatherings with Friends and Family: Not on file    Attends Muslim Services: Not on file   [place] Active Member of Clubs or Organizations: Not on file    Attends Club or Organization Meetings: Not on file    Marital Status: Not on file   Intimate Partner Violence:     Fear of Current or Ex-Partner: Not on file    Emotionally Abused: Not on file    Physically Abused: Not on file    Sexually Abused: Not on file   Housing Stability:     Unable to Pay for Housing in the Last Year: Not on file    Number of [place] in the Last Year: Not on file    Unstable Housing in the Last Year: Not on file       Objective:   Physical Exam    Right breast - fullness upper outer quadrant with 2 cm firm area. Overlying skin thickening present. Palpable nodes in axilla. Nipple retraction noted. Left breast - Normal contour, no masses, no nipple or skin changes. Assessment:      Diagnosis Orders   1. Malignant neoplasm of upper-outer quadrant of right breast in female, estrogen receptor negative ([facility] [address]  [facility] Genetic Risk Evaluation & Testing Program    CT CHEST ABDOMEN PELVIS WO CONTRAST    NM BONE SCAN WHOLE BODY          Plan:     Spent 10 minutes reviewing her records and imaging prior to her visit. I personally reviewed her imaging with radiology. History and physical exam 10 minutes. Discussion 30 minutes. Discussed breast biopsy results with patient and her daughter. Reviewed surgical and chemotherapy options. Recommendations were made following standard NCCN guidelines. I spoke with her oncologist, Dr. Dimitry Merchant. Will order a CT of the chest, abdomen, and pelvis with oral contrast, and also a bone scan. Will also order genetic testing. Patient is due to see oncology later this week to discuss neoadjuvant chemotherapy. Follow up based on testing and oncology recommendations.      On this date 04/11/22 I have spent 50 minutes reviewing previous notes, test results, and face to face with the patient discussing the diagnosis and importance of compliance with the treatment plan as well as documenting on the day of the visit.      Electronically signed by Mary Marques MD on 4/11/2022 at 9:44 AM

## 2022-04-11 NOTE — PATIENT INSTRUCTIONS
Medical history was discussed with patient and daughter. Breast exam reveals palpable area in the right breast/left unremarkable  Pathology results and treatment options were discussed  Patient to have genetic testing  Patient is scheduled to see Dr Shannon Shah later in the week  Patient to follow up if surgery is needed.

## 2022-04-19 ENCOUNTER — HOSPITAL ENCOUNTER (OUTPATIENT)
Dept: CT IMAGING | Age: 87
Discharge: HOME | End: 2022-04-19
Payer: MEDICARE

## 2022-04-19 ENCOUNTER — HOSPITAL ENCOUNTER (OUTPATIENT)
Dept: NUCLEAR MEDICINE | Age: 87
Discharge: HOME | End: 2022-04-19
Payer: MEDICARE

## 2022-04-19 DIAGNOSIS — Z17.1 MALIGNANT NEOPLASM OF UPPER-OUTER QUADRANT OF RIGHT BREAST IN FEMALE, ESTROGEN RECEPTOR NEGATIVE (HCC): ICD-10-CM

## 2022-04-19 DIAGNOSIS — C50.411 MALIGNANT NEOPLASM OF UPPER-OUTER QUADRANT OF RIGHT BREAST IN FEMALE, ESTROGEN RECEPTOR NEGATIVE (HCC): ICD-10-CM

## 2022-04-19 PROCEDURE — 3430000000 HC RX DIAGNOSTIC RADIOPHARMACEUTICAL: Performed by: SURGERY

## 2022-04-19 PROCEDURE — A9503 TC99M MEDRONATE: HCPCS | Performed by: SURGERY

## 2022-04-19 PROCEDURE — 78306 BONE IMAGING WHOLE BODY: CPT

## 2022-04-19 PROCEDURE — 74176 CT ABD & PELVIS W/O CONTRAST: CPT

## 2022-04-19 PROCEDURE — 6360000004 HC RX CONTRAST MEDICATION: Performed by: SURGERY

## 2022-04-19 RX ORDER — TC 99M MEDRONATE 20 MG/10ML
25 INJECTION, POWDER, LYOPHILIZED, FOR SOLUTION INTRAVENOUS
Status: COMPLETED | OUTPATIENT
Start: 2022-04-19 | End: 2022-04-19

## 2022-04-19 RX ADMIN — IOHEXOL 50 ML: 240 INJECTION, SOLUTION INTRATHECAL; INTRAVASCULAR; INTRAVENOUS; ORAL at 07:05

## 2022-04-19 RX ADMIN — TC 99M MEDRONATE 25 MILLICURIE: 20 INJECTION, POWDER, LYOPHILIZED, FOR SOLUTION INTRAVENOUS at 07:27

## 2022-04-26 ENCOUNTER — HOSPITAL ENCOUNTER (OUTPATIENT)
Dept: INTERVENTIONAL RADIOLOGY/VASCULAR | Age: 87
Discharge: HOME | End: 2022-04-26
Payer: MEDICARE

## 2022-04-26 VITALS
DIASTOLIC BLOOD PRESSURE: 58 MMHG | TEMPERATURE: 97.8 F | SYSTOLIC BLOOD PRESSURE: 159 MMHG | OXYGEN SATURATION: 96 % | WEIGHT: 142.5 LBS | HEIGHT: 63 IN | RESPIRATION RATE: 18 BRPM | HEART RATE: 65 BPM | BODY MASS INDEX: 25.25 KG/M2

## 2022-04-26 DIAGNOSIS — C50.411 MALIGNANT NEOPLASM OF UPPER-OUTER QUADRANT OF RIGHT FEMALE BREAST, UNSPECIFIED ESTROGEN RECEPTOR STATUS (HCC): ICD-10-CM

## 2022-04-26 LAB
BASOPHILS ABSOLUTE: 0.1 K/UL (ref 0–0.2)
BASOPHILS RELATIVE PERCENT: 0.7 %
EOSINOPHILS ABSOLUTE: 1.2 K/UL (ref 0–0.6)
EOSINOPHILS RELATIVE PERCENT: 14.8 %
HCT VFR BLD CALC: 40.6 % (ref 36–48)
HEMOGLOBIN: 13.3 G/DL (ref 12–16)
INR BLD: 0.97 (ref 0.88–1.12)
LYMPHOCYTES ABSOLUTE: 1.5 K/UL (ref 1–5.1)
LYMPHOCYTES RELATIVE PERCENT: 18.9 %
MCH RBC QN AUTO: 26.6 PG (ref 26–34)
MCHC RBC AUTO-ENTMCNC: 32.8 G/DL (ref 31–36)
MCV RBC AUTO: 81.4 FL (ref 80–100)
MONOCYTES ABSOLUTE: 0.6 K/UL (ref 0–1.3)
MONOCYTES RELATIVE PERCENT: 7.4 %
NEUTROPHILS ABSOLUTE: 4.6 K/UL (ref 1.7–7.7)
NEUTROPHILS RELATIVE PERCENT: 58.2 %
PDW BLD-RTO: 14.8 % (ref 12.4–15.4)
PLATELET # BLD: 227 K/UL (ref 135–450)
PMV BLD AUTO: 8.9 FL (ref 5–10.5)
PROTHROMBIN TIME: 11 SEC (ref 9.9–12.7)
RBC # BLD: 5 M/UL (ref 4–5.2)
WBC # BLD: 7.9 K/UL (ref 4–11)

## 2022-04-26 PROCEDURE — 99153 MOD SED SAME PHYS/QHP EA: CPT

## 2022-04-26 PROCEDURE — 36415 COLL VENOUS BLD VENIPUNCTURE: CPT

## 2022-04-26 PROCEDURE — 36561 INSERT TUNNELED CV CATH: CPT

## 2022-04-26 PROCEDURE — 6360000002 HC RX W HCPCS: Performed by: RADIOLOGY

## 2022-04-26 PROCEDURE — 77001 FLUOROGUIDE FOR VEIN DEVICE: CPT

## 2022-04-26 PROCEDURE — 2709999900 IR PORT PLACEMENT > 5 YEARS

## 2022-04-26 PROCEDURE — 2580000003 HC RX 258: Performed by: RADIOLOGY

## 2022-04-26 PROCEDURE — 85025 COMPLETE CBC W/AUTO DIFF WBC: CPT

## 2022-04-26 PROCEDURE — 99152 MOD SED SAME PHYS/QHP 5/>YRS: CPT

## 2022-04-26 PROCEDURE — 7100000011 HC PHASE II RECOVERY - ADDTL 15 MIN

## 2022-04-26 PROCEDURE — 85610 PROTHROMBIN TIME: CPT

## 2022-04-26 PROCEDURE — 76937 US GUIDE VASCULAR ACCESS: CPT

## 2022-04-26 PROCEDURE — 7100000010 HC PHASE II RECOVERY - FIRST 15 MIN

## 2022-04-26 RX ORDER — ACETAMINOPHEN 325 MG/1
650 TABLET ORAL EVERY 4 HOURS PRN
Status: DISCONTINUED | OUTPATIENT
Start: 2022-04-26 | End: 2022-04-27 | Stop reason: HOSPADM

## 2022-04-26 RX ORDER — MIDAZOLAM HYDROCHLORIDE 1 MG/ML
INJECTION INTRAMUSCULAR; INTRAVENOUS DAILY PRN
Status: COMPLETED | OUTPATIENT
Start: 2022-04-26 | End: 2022-04-26

## 2022-04-26 RX ORDER — SODIUM CHLORIDE 9 MG/ML
INJECTION, SOLUTION INTRAVENOUS PRN
Status: DISCONTINUED | OUTPATIENT
Start: 2022-04-26 | End: 2022-04-27 | Stop reason: HOSPADM

## 2022-04-26 RX ORDER — FENTANYL CITRATE 50 UG/ML
INJECTION, SOLUTION INTRAMUSCULAR; INTRAVENOUS DAILY PRN
Status: COMPLETED | OUTPATIENT
Start: 2022-04-26 | End: 2022-04-26

## 2022-04-26 RX ADMIN — SODIUM CHLORIDE 100 ML/HR: 9 INJECTION, SOLUTION INTRAVENOUS at 09:26

## 2022-04-26 RX ADMIN — MIDAZOLAM 1 MG: 1 INJECTION INTRAMUSCULAR; INTRAVENOUS at 10:29

## 2022-04-26 RX ADMIN — FENTANYL CITRATE 50 MCG: 50 INJECTION INTRAMUSCULAR; INTRAVENOUS at 10:30

## 2022-04-26 RX ADMIN — FENTANYL CITRATE 50 MCG: 50 INJECTION INTRAMUSCULAR; INTRAVENOUS at 10:41

## 2022-04-26 RX ADMIN — MIDAZOLAM 1 MG: 1 INJECTION INTRAMUSCULAR; INTRAVENOUS at 10:41

## 2022-04-26 RX ADMIN — CEFAZOLIN SODIUM 2000 MG: 10 INJECTION, POWDER, FOR SOLUTION INTRAVENOUS at 10:02

## 2022-04-26 ASSESSMENT — PAIN - FUNCTIONAL ASSESSMENT
PAIN_FUNCTIONAL_ASSESSMENT: ACTIVITIES ARE NOT PREVENTED
PAIN_FUNCTIONAL_ASSESSMENT: 0-10

## 2022-04-26 ASSESSMENT — PAIN DESCRIPTION - DESCRIPTORS: DESCRIPTORS: THROBBING

## 2022-04-26 NOTE — PROGRESS NOTES
Pt discharged to home. Transported in wheelchair. Accompanied by Tucker Salcedo. Transported in personal vehicle. Discharge instructions  and personal belongings given to pt. Explanation of discharge medications and instructions understood by verbal statement. No questions, comments or concerns at this time.        Electronically signed by Nacho Melchor RN on 4/26/2022 at 12:58 PM

## 2022-04-26 NOTE — BRIEF OP NOTE
Brief Postoperative Note    Dante Celeste  YOB: 1933  [id number]    Pre-operative Diagnosis: Breast cancer    Post-operative Diagnosis: Same    Procedure: Port placement    Anesthesia: Moderate Sedation    Surgeons: Suraj Gutiérrez MD    Estimated Blood Loss: Less than 5 mL    Complications: None    Specimens: Was Not Obtained    Findings: Successful placement of a right IJ port.     Electronically signed by Suraj Gutiérrez MD on 4/26/2022 at 10:57 AM

## 2022-04-26 NOTE — PROGRESS NOTES
Pt tolerates PO well. Urge to void. Daughter assisted pt to walk to bathroom and voided. Back to room and resting on stretcher.

## 2022-04-26 NOTE — PRE SEDATION
Sedation Pre-Procedure Note    Patient Name: Ernesto Kuhn   YOB: 1933  Room/Bed: Room/bed info not found  Medical Record Number: [id number]  Date: 4/26/2022   Time: 10:56 AM       Indication:  Breast cancer here for port placement. Consent: I have discussed with the patient and/or the patient representative the indication, alternatives, and the possible risks and/or complications of the planned procedure and the anesthesia methods. The patient and/or patient representative appear to understand and agree to proceed. Vital Signs:   Vitals:    04/26/22 1050   BP:    Pulse: 72   Resp: 16   Temp:    SpO2: 100%       Past Medical History:   has a past medical history of Diverticulitis, Glaucoma, and Macular degeneration. Past Surgical History:   has a past surgical history that includes Colonoscopy (2007); bladder suspension; US BREAST BIOPSY W LOC DEVICE 1ST LESION RIGHT (Right, 04/06/2022); US BIOPSY LYMPH NODE (04/06/2022); and IR PORT PLACEMENT > 5 YEARS (Left, 04/26/2022). Medications:   Scheduled Meds:   Continuous Infusions:    sodium chloride 100 mL/hr (04/26/22 0926)     PRN Meds: sodium chloride  Home Meds:   Prior to Admission medications    Medication Sig Start Date End Date Taking? Authorizing Provider   vitamin D (ERGOCALCIFEROL) 1.25 MG (82574 UT) CAPS capsule Take 1 capsule by mouth once a week  Patient taking differently: Take 50,000 Units by mouth once a week Hasn't started it yet.  3/31/22   Genesis Hannon DO   dorzolamide-timolol (COSOPT) 22.3-6.8 MG/ML ophthalmic solution  8/25/21   Historical Provider, MD   latanoprost (XALATAN) 0.005 % ophthalmic solution  7/25/21   Historical Provider, MD   Elastic Bandages & Supports ([facility]) [address] 1 each by Does not apply route daily as needed (20-30mm) 20-30 mm compression 7/2/21   MARGARITO Collins - CNP   psyllium (KONSYL) 28.3 % PACK Take 1 packet by mouth daily     Historical Provider, MD   Multiple Vitamins-Minerals (MULTIVITAMIN PO) Take by mouth     Historical Provider, MD     Coumadin Use Last 7 Days:  no  Antiplatelet drug therapy use last 7 days: no  Other anticoagulant use last 7 days: no  Additional Medication Information:  n/a      Pre-Sedation Documentation and Exam:   I have reviewed the patient's history and review of systems.     Mallampati Airway Assessment:  Mallampati Class II - (soft palate, fauces & uvula are visible)    Prior History of Anesthesia Complications:   none    ASA Classification:  Class 2 - A normal healthy patient with mild systemic disease    Sedation/ Anesthesia Plan:   intravenous sedation    Medications Planned:   midazolam (Versed) intravenously and fentanyl intravenously    Patient is an appropriate candidate for plan of sedation: yes    Electronically signed by Suraj Gutiérrez MD on 4/26/2022 at 10:56 AM

## 2022-04-26 NOTE — PROGRESS NOTES
Steri strips from port placement clean dry and intact.       Electronically signed by Nacho Melchor RN on 4/26/2022 at 12:27 PM

## 2022-06-22 ENCOUNTER — HOSPITAL ENCOUNTER (OUTPATIENT)
Age: 87
Discharge: HOME | End: 2022-06-22
Payer: MEDICARE

## 2022-06-22 ENCOUNTER — HOSPITAL ENCOUNTER (OUTPATIENT)
Dept: GENERAL RADIOLOGY | Age: 87
Discharge: HOME | End: 2022-06-22
Payer: MEDICARE

## 2022-06-22 DIAGNOSIS — R06.89 DYSPNEA AND RESPIRATORY ABNORMALITY: ICD-10-CM

## 2022-06-22 DIAGNOSIS — R06.00 DYSPNEA AND RESPIRATORY ABNORMALITY: ICD-10-CM

## 2022-06-22 PROCEDURE — 71046 X-RAY EXAM CHEST 2 VIEWS: CPT

## 2022-07-09 ENCOUNTER — HOSPITAL ENCOUNTER (INPATIENT)
Age: 87
LOS: 4 days | Discharge: HOSPICE | DRG: 543 | End: 2022-07-13
Attending: EMERGENCY MEDICINE | Admitting: INTERNAL MEDICINE
Payer: MEDICARE

## 2022-07-09 ENCOUNTER — APPOINTMENT (OUTPATIENT)
Dept: CT IMAGING | Age: 87
DRG: 543 | End: 2022-07-09
Payer: MEDICARE

## 2022-07-09 DIAGNOSIS — K76.9 LESION OF LIVER: ICD-10-CM

## 2022-07-09 DIAGNOSIS — S22.070A COMPRESSION FRACTURE OF T10 VERTEBRA, INITIAL ENCOUNTER (HCC): ICD-10-CM

## 2022-07-09 DIAGNOSIS — M54.50 ACUTE LOW BACK PAIN WITHOUT SCIATICA, UNSPECIFIED BACK PAIN LATERALITY: ICD-10-CM

## 2022-07-09 DIAGNOSIS — S22.080A COMPRESSION FRACTURE OF T12 VERTEBRA, INITIAL ENCOUNTER (HCC): ICD-10-CM

## 2022-07-09 DIAGNOSIS — S22.008A CLOSED FRACTURE OF SPINOUS PROCESS OF THORACIC VERTEBRA, INITIAL ENCOUNTER (HCC): ICD-10-CM

## 2022-07-09 DIAGNOSIS — C50.919 MALIGNANT NEOPLASM OF FEMALE BREAST, UNSPECIFIED ESTROGEN RECEPTOR STATUS, UNSPECIFIED LATERALITY, UNSPECIFIED SITE OF BREAST (HCC): ICD-10-CM

## 2022-07-09 DIAGNOSIS — C79.51 MALIGNANT NEOPLASM METASTATIC TO BONE (HCC): Primary | ICD-10-CM

## 2022-07-09 PROBLEM — C50.911 BREAST CANCER, STAGE 4, RIGHT (HCC): Status: ACTIVE | Noted: 2022-07-09

## 2022-07-09 PROBLEM — G89.3 CANCER ASSOCIATED PAIN: Status: ACTIVE | Noted: 2022-07-09

## 2022-07-09 PROBLEM — M84.58XA PATHOLOGICAL FRACTURE OF VERTEBRA DUE TO NEOPLASTIC DISEASE: Status: ACTIVE | Noted: 2022-07-09

## 2022-07-09 LAB
ANION GAP SERPL CALCULATED.3IONS-SCNC: 11 MMOL/L (ref 3–16)
BASOPHILS ABSOLUTE: 0 K/UL (ref 0–0.2)
BASOPHILS RELATIVE PERCENT: 0.4 %
BUN BLDV-MCNC: 29 MG/DL (ref 7–20)
CALCIUM SERPL-MCNC: 8.6 MG/DL (ref 8.3–10.6)
CHLORIDE BLD-SCNC: 103 MMOL/L (ref 99–110)
CO2: 23 MMOL/L (ref 21–32)
CREAT SERPL-MCNC: 1 MG/DL (ref 0.6–1.2)
EOSINOPHILS ABSOLUTE: 0.1 K/UL (ref 0–0.6)
EOSINOPHILS RELATIVE PERCENT: 1.4 %
GFR AFRICAN AMERICAN: >60
GFR NON-AFRICAN AMERICAN: 52
GLUCOSE BLD-MCNC: 66 MG/DL (ref 70–99)
HCT VFR BLD CALC: 37.9 % (ref 36–48)
HEMOGLOBIN: 12.7 G/DL (ref 12–16)
LYMPHOCYTES ABSOLUTE: 1.4 K/UL (ref 1–5.1)
LYMPHOCYTES RELATIVE PERCENT: 16.3 %
MCH RBC QN AUTO: 27 PG (ref 26–34)
MCHC RBC AUTO-ENTMCNC: 33.4 G/DL (ref 31–36)
MCV RBC AUTO: 80.6 FL (ref 80–100)
MONOCYTES ABSOLUTE: 0.4 K/UL (ref 0–1.3)
MONOCYTES RELATIVE PERCENT: 4.2 %
NEUTROPHILS ABSOLUTE: 6.9 K/UL (ref 1.7–7.7)
NEUTROPHILS RELATIVE PERCENT: 77.7 %
PDW BLD-RTO: 18.3 % (ref 12.4–15.4)
PLATELET # BLD: 86 K/UL (ref 135–450)
PLATELET SLIDE REVIEW: ABNORMAL
PMV BLD AUTO: 9.1 FL (ref 5–10.5)
POTASSIUM REFLEX MAGNESIUM: 4.3 MMOL/L (ref 3.5–5.1)
RBC # BLD: 4.71 M/UL (ref 4–5.2)
SODIUM BLD-SCNC: 137 MMOL/L (ref 136–145)
WBC # BLD: 8.8 K/UL (ref 4–11)

## 2022-07-09 PROCEDURE — 6360000002 HC RX W HCPCS: Performed by: INTERNAL MEDICINE

## 2022-07-09 PROCEDURE — 36415 COLL VENOUS BLD VENIPUNCTURE: CPT

## 2022-07-09 PROCEDURE — 99285 EMERGENCY DEPT VISIT HI MDM: CPT

## 2022-07-09 PROCEDURE — 85025 COMPLETE CBC W/AUTO DIFF WBC: CPT

## 2022-07-09 PROCEDURE — 6370000000 HC RX 637 (ALT 250 FOR IP): Performed by: PHYSICIAN ASSISTANT

## 2022-07-09 PROCEDURE — 6370000000 HC RX 637 (ALT 250 FOR IP): Performed by: INTERNAL MEDICINE

## 2022-07-09 PROCEDURE — 2580000003 HC RX 258: Performed by: PHYSICIAN ASSISTANT

## 2022-07-09 PROCEDURE — 72131 CT LUMBAR SPINE W/O DYE: CPT

## 2022-07-09 PROCEDURE — 1200000000 HC SEMI PRIVATE

## 2022-07-09 PROCEDURE — 72128 CT CHEST SPINE W/O DYE: CPT

## 2022-07-09 PROCEDURE — 80048 BASIC METABOLIC PNL TOTAL CA: CPT

## 2022-07-09 RX ORDER — SODIUM CHLORIDE 0.9 % (FLUSH) 0.9 %
10 SYRINGE (ML) INJECTION PRN
Status: DISCONTINUED | OUTPATIENT
Start: 2022-07-09 | End: 2022-07-14 | Stop reason: HOSPADM

## 2022-07-09 RX ORDER — ACETAMINOPHEN 325 MG/1
650 TABLET ORAL EVERY 6 HOURS PRN
Status: DISCONTINUED | OUTPATIENT
Start: 2022-07-09 | End: 2022-07-14 | Stop reason: HOSPADM

## 2022-07-09 RX ORDER — POTASSIUM CHLORIDE 7.45 MG/ML
10 INJECTION INTRAVENOUS PRN
Status: DISCONTINUED | OUTPATIENT
Start: 2022-07-09 | End: 2022-07-14 | Stop reason: HOSPADM

## 2022-07-09 RX ORDER — OXYCODONE HCL 10 MG/1
10 TABLET, FILM COATED, EXTENDED RELEASE ORAL EVERY 12 HOURS SCHEDULED
Status: DISCONTINUED | OUTPATIENT
Start: 2022-07-09 | End: 2022-07-10

## 2022-07-09 RX ORDER — LATANOPROST 50 UG/ML
1 SOLUTION/ DROPS OPHTHALMIC NIGHTLY
Status: DISCONTINUED | OUTPATIENT
Start: 2022-07-09 | End: 2022-07-14 | Stop reason: HOSPADM

## 2022-07-09 RX ORDER — POTASSIUM CHLORIDE 20 MEQ/1
40 TABLET, EXTENDED RELEASE ORAL PRN
Status: DISCONTINUED | OUTPATIENT
Start: 2022-07-09 | End: 2022-07-14 | Stop reason: HOSPADM

## 2022-07-09 RX ORDER — SODIUM CHLORIDE 9 MG/ML
INJECTION, SOLUTION INTRAVENOUS PRN
Status: DISCONTINUED | OUTPATIENT
Start: 2022-07-09 | End: 2022-07-14 | Stop reason: HOSPADM

## 2022-07-09 RX ORDER — 0.9 % SODIUM CHLORIDE 0.9 %
500 INTRAVENOUS SOLUTION INTRAVENOUS ONCE
Status: COMPLETED | OUTPATIENT
Start: 2022-07-09 | End: 2022-07-09

## 2022-07-09 RX ORDER — METHOCARBAMOL 500 MG/1
1000 TABLET, FILM COATED ORAL 4 TIMES DAILY PRN
Status: DISCONTINUED | OUTPATIENT
Start: 2022-07-09 | End: 2022-07-14 | Stop reason: HOSPADM

## 2022-07-09 RX ORDER — MAGNESIUM SULFATE 1 G/100ML
1000 INJECTION INTRAVENOUS PRN
Status: DISCONTINUED | OUTPATIENT
Start: 2022-07-09 | End: 2022-07-14 | Stop reason: HOSPADM

## 2022-07-09 RX ORDER — ONDANSETRON 4 MG/1
4 TABLET, ORALLY DISINTEGRATING ORAL EVERY 8 HOURS PRN
Status: DISCONTINUED | OUTPATIENT
Start: 2022-07-09 | End: 2022-07-14 | Stop reason: HOSPADM

## 2022-07-09 RX ORDER — ACETAMINOPHEN 325 MG/1
650 TABLET ORAL ONCE
Status: COMPLETED | OUTPATIENT
Start: 2022-07-09 | End: 2022-07-09

## 2022-07-09 RX ORDER — DORZOLAMIDE HYDROCHLORIDE AND TIMOLOL MALEATE 20; 5 MG/ML; MG/ML
1 SOLUTION/ DROPS OPHTHALMIC 2 TIMES DAILY
Status: DISCONTINUED | OUTPATIENT
Start: 2022-07-09 | End: 2022-07-14 | Stop reason: HOSPADM

## 2022-07-09 RX ORDER — ENOXAPARIN SODIUM 100 MG/ML
40 INJECTION SUBCUTANEOUS DAILY
Status: DISCONTINUED | OUTPATIENT
Start: 2022-07-09 | End: 2022-07-14 | Stop reason: HOSPADM

## 2022-07-09 RX ORDER — LIDOCAINE 4 G/G
1 PATCH TOPICAL DAILY
Status: DISCONTINUED | OUTPATIENT
Start: 2022-07-09 | End: 2022-07-14 | Stop reason: HOSPADM

## 2022-07-09 RX ORDER — OXYCODONE HYDROCHLORIDE 5 MG/1
5 TABLET ORAL EVERY 4 HOURS PRN
Status: DISCONTINUED | OUTPATIENT
Start: 2022-07-09 | End: 2022-07-14 | Stop reason: HOSPADM

## 2022-07-09 RX ORDER — ONDANSETRON 2 MG/ML
4 INJECTION INTRAMUSCULAR; INTRAVENOUS EVERY 6 HOURS PRN
Status: DISCONTINUED | OUTPATIENT
Start: 2022-07-09 | End: 2022-07-14 | Stop reason: HOSPADM

## 2022-07-09 RX ORDER — SENNA PLUS 8.6 MG/1
1 TABLET ORAL NIGHTLY
Status: DISCONTINUED | OUTPATIENT
Start: 2022-07-09 | End: 2022-07-14 | Stop reason: HOSPADM

## 2022-07-09 RX ORDER — CYCLOBENZAPRINE HCL 10 MG
5 TABLET ORAL ONCE
Status: COMPLETED | OUTPATIENT
Start: 2022-07-09 | End: 2022-07-09

## 2022-07-09 RX ORDER — ACETAMINOPHEN 650 MG/1
650 SUPPOSITORY RECTAL EVERY 6 HOURS PRN
Status: DISCONTINUED | OUTPATIENT
Start: 2022-07-09 | End: 2022-07-14 | Stop reason: HOSPADM

## 2022-07-09 RX ORDER — MULTIVITAMIN WITH IRON
1 TABLET ORAL DAILY
Status: DISCONTINUED | OUTPATIENT
Start: 2022-07-10 | End: 2022-07-14 | Stop reason: HOSPADM

## 2022-07-09 RX ORDER — CALCIUM CARBONATE 200(500)MG
1000 TABLET,CHEWABLE ORAL 3 TIMES DAILY PRN
Status: DISCONTINUED | OUTPATIENT
Start: 2022-07-09 | End: 2022-07-14 | Stop reason: HOSPADM

## 2022-07-09 RX ORDER — LANOLIN ALCOHOL/MO/W.PET/CERES
3 CREAM (GRAM) TOPICAL NIGHTLY PRN
Status: DISCONTINUED | OUTPATIENT
Start: 2022-07-09 | End: 2022-07-14 | Stop reason: HOSPADM

## 2022-07-09 RX ADMIN — SODIUM CHLORIDE 500 ML: 9 INJECTION, SOLUTION INTRAVENOUS at 11:23

## 2022-07-09 RX ADMIN — SENNOSIDES 8.6 MG: 8.6 TABLET, FILM COATED ORAL at 21:10

## 2022-07-09 RX ADMIN — OXYCODONE HYDROCHLORIDE 10 MG: 10 TABLET, FILM COATED, EXTENDED RELEASE ORAL at 21:10

## 2022-07-09 RX ADMIN — CYCLOBENZAPRINE 5 MG: 10 TABLET, FILM COATED ORAL at 10:57

## 2022-07-09 RX ADMIN — HYDROMORPHONE HYDROCHLORIDE 0.5 MG: 1 INJECTION, SOLUTION INTRAMUSCULAR; INTRAVENOUS; SUBCUTANEOUS at 18:04

## 2022-07-09 RX ADMIN — HYDROMORPHONE HYDROCHLORIDE 0.5 MG: 1 INJECTION, SOLUTION INTRAMUSCULAR; INTRAVENOUS; SUBCUTANEOUS at 21:09

## 2022-07-09 RX ADMIN — OXYCODONE 5 MG: 5 TABLET ORAL at 16:15

## 2022-07-09 RX ADMIN — METHOCARBAMOL 1000 MG: 500 TABLET ORAL at 18:04

## 2022-07-09 RX ADMIN — ACETAMINOPHEN 650 MG: 325 TABLET ORAL at 10:57

## 2022-07-09 ASSESSMENT — PAIN DESCRIPTION - LOCATION
LOCATION: BACK

## 2022-07-09 ASSESSMENT — PAIN SCALES - GENERAL
PAINLEVEL_OUTOF10: 10
PAINLEVEL_OUTOF10: 10
PAINLEVEL_OUTOF10: 0
PAINLEVEL_OUTOF10: 10

## 2022-07-09 ASSESSMENT — PAIN DESCRIPTION - DESCRIPTORS
DESCRIPTORS: ACHING
DESCRIPTORS: ACHING
DESCRIPTORS: SHARP;STABBING
DESCRIPTORS: PENETRATING

## 2022-07-09 ASSESSMENT — ENCOUNTER SYMPTOMS
SHORTNESS OF BREATH: 0
COLOR CHANGE: 0
CONSTIPATION: 0
COUGH: 0
VOMITING: 0
ABDOMINAL PAIN: 0
NAUSEA: 0
BACK PAIN: 1
DIARRHEA: 0

## 2022-07-09 ASSESSMENT — PAIN DESCRIPTION - FREQUENCY
FREQUENCY: CONTINUOUS

## 2022-07-09 ASSESSMENT — PAIN - FUNCTIONAL ASSESSMENT
PAIN_FUNCTIONAL_ASSESSMENT: INTOLERABLE, UNABLE TO DO ANY ACTIVE OR PASSIVE ACTIVITIES
PAIN_FUNCTIONAL_ASSESSMENT: 0-10
PAIN_FUNCTIONAL_ASSESSMENT: INTOLERABLE, UNABLE TO DO ANY ACTIVE OR PASSIVE ACTIVITIES

## 2022-07-09 ASSESSMENT — PAIN DESCRIPTION - ORIENTATION
ORIENTATION: LOWER
ORIENTATION: MID;LOWER
ORIENTATION: LOWER;MID
ORIENTATION: LOWER;RIGHT

## 2022-07-09 ASSESSMENT — PAIN DESCRIPTION - PAIN TYPE
TYPE: ACUTE PAIN

## 2022-07-09 ASSESSMENT — PAIN DESCRIPTION - ONSET
ONSET: ON-GOING
ONSET: ON-GOING

## 2022-07-09 NOTE — ED PROVIDER NOTES
I independently examined and evaluated Joya Frankel. In brief their history revealed to the emergency department with lower back pain. Patient has a history of cancer and is receiving chemotherapy. She states that she had a herniated disc many years ago. Patient states that she feels a \"catch\" in her back and has significant pain. This pain does not radiate down her legs. She denies bowel bladder incontinence, saddle anesthesia, numbness tingling or weakness down her legs. Family states she was not really able to walk around this morning which is unusual for her. . Their exam revealed full range of motion of the lower extremities with normal sensation and movement. . All diagnostic, treatment, and disposition decisions were made by myself in conjunction with the mid-level provider. Before disposition, questions were sought and answered with the daughter. They have voiced understanding and agree with the plan. CT of the thoracic spine shows diffuse osseous metastatic disease with new and progression from April. She also has a compression fracture with 25% height loss that may represent a pathologic fracture and either an acute or subacute minimal T12 fracture. We discussed this with the patient and her daughter. We recommended admission as she is not set up yet with hospice or with any equipment at home. They did agree with this. For all further details of the patient's emergency department visit, please see the mid-level practitioner's documentation.        Rhett Cruz MD  07/09/22 7155

## 2022-07-09 NOTE — PLAN OF CARE
Neurosurgery Plan of Care    79 y/o woman w/ hx of breast cancer presents with acute onset low back pain without hx of trauma. Neurologically intact per report. CT T- and L-spine reveals a T10 pathologic compression and SP fracture. I recommend off-the-shelf TLSO brace to be worn when upright and out of bed. May be applied while sitting at the bedside. I have notified [employer] and they will be delivering the brace. Patient will also need MRI W/ contrast of C-T- and L-spine and radiation oncology consult. No surgical intervention will be offered at this time. If pain is intractable despite brace, may consider kyphoplasty.     Sky Rios MD  Neurosurgery  [facility]  [phone]

## 2022-07-09 NOTE — ED PROVIDER NOTES
[address] [facility]        Pt Name: Kavon Devi  MRN: [id number]  [place] 12/18/1933  Date of evaluation: 7/9/2022  Provider: ORIANA Quispe  PCP: Audi Rea DO  Note Started: 10:20 AM EDT        I have seen and evaluated this patient with my supervising physician Jennifer Woodson MD.    [address] [place] [address]       Chief Complaint   Patient presents with    Back Pain     lower back pain for 3 days. Worse when moving. Currently has breast cancer and receiving chemotherapy. HISTORY OF PRESENT ILLNESS   (Location, Timing/Onset, Context/Setting, Quality, Duration, Modifying Factors, Severity, Associated Signs and Symptoms)  Note limiting factors. Chief Complaint: Back pain x 3 days     Kavon Devi is a 80 y.o. female who presents to the emergency department today with her daughter with complaints of back pain. She states the pain has been present for the last 3 days and has been getting steadily worse. She states that sometimes she can find a position that is comfortable, but certain movements cause her back to \"catch \", causing excruciating pain. She was seen by hospice care yesterday, and they started her on tramadol and cyclobenzaprine. She took the cyclobenzaprine last night, and slept okay, but this morning she was unable to get up out of bed due to pain. Her daughter then brought her in for evaluation because she just was not able to be moved. Patient is currently being treated by Dr. Jerman Burnett for breast cancer. She did have a bone scan in April that did not show significant evidence of metastatic disease. Patient denies any numbness, tingling. She denies any bowel or bladder incontinence. She denies any cough or shortness of breath. She has no further complaints at this time. Nursing Notes were all reviewed and agreed with or any disagreements were addressed in the HPI.     REVIEW OF SYSTEMS    (2-9 systems for level 4, 10 or more for level 5)     Review of Systems   Constitutional: Negative for chills and fever. Respiratory: Negative for cough and shortness of breath. Cardiovascular: Negative for chest pain and palpitations. Gastrointestinal: Negative for abdominal pain, constipation, diarrhea, nausea and vomiting. Genitourinary: Negative for difficulty urinating, dysuria, frequency and hematuria. Musculoskeletal: Positive for back pain, gait problem (due to pain in her back) and myalgias. Negative for arthralgias. Skin: Negative for color change and rash. Neurological: Negative for dizziness, weakness, light-headedness and numbness. Psychiatric/Behavioral: Negative for agitation and confusion. Positives and Pertinent negatives as per HPI. Except as noted above in the ROS, all other systems were reviewed and negative.        PAST MEDICAL HISTORY     Past Medical History:   Diagnosis Date    Cancer [facility])     breast cancer    Diverticulitis     Glaucoma     Macular degeneration          SURGICAL HISTORY     Past Surgical History:   Procedure Laterality Date    BLADDER SUSPENSION      COLONOSCOPY  2007    IR PORT PLACEMENT EQUAL OR GREATER THAN 5 YEARS Left 04/26/2022    Power Port; Renea access; 25.5cm; Dr. Zbigniew Arguelles 5 YEARS  4/26/2022    IR PORT PLACEMENT EQUAL OR GREATER THAN 5 YEARS 4/26/2022 [facility] PROCEDURES    US BREAST NEEDLE BIOPSY RIGHT Right 04/06/2022    US BREAST NEEDLE BIOPSY RIGHT 4/6/2022 [facility] ULTRASOUND    US LYMPH NODE BIOPSY  04/06/2022    US LYMPH NODE BIOPSY 4/6/2022 [facility] ULTRASOUND         CURRENTMEDICATIONS       Previous Medications    DORZOLAMIDE-TIMOLOL (COSOPT) 22.3-6.8 MG/ML OPHTHALMIC SOLUTION        ELASTIC BANDAGES & SUPPORTS (MEDICAL COMPRESSION STOCKINGS) MISC    1 each by Does not apply route daily as needed (20-30mm) 20-30 mm compression    LATANOPROST (XALATAN) 0.005 % OPHTHALMIC SOLUTION MULTIPLE VITAMINS-MINERALS (MULTIVITAMIN PO)    Take by mouth     PSYLLIUM (KONSYL) 28.3 % PACK    Take 1 packet by mouth daily     VITAMIN D (ERGOCALCIFEROL) 1.25 MG (36853 UT) CAPS CAPSULE    Take 1 capsule by mouth once a week         ALLERGIES     Morphine and Sulfa antibiotics    FAMILYHISTORY     History reviewed. No pertinent family history. SOCIAL HISTORY       Social History     Tobacco Use    Smoking status: Never Smoker    Smokeless tobacco: Never Used   Substance Use Topics    Alcohol use: No    Drug use: No       SCREENINGS    [place] Coma Scale  Eye Opening: Spontaneous  Best Verbal Response: Oriented  Best Motor Response: Obeys commands  [place] Coma Scale Score: 15        PHYSICAL EXAM    (up to 7 for level 4, 8 or more for level 5)     ED Triage Vitals [07/09/22 0945]   BP Temp Temp Source Heart Rate Resp SpO2 Height Weight   (!) 160/64 98.3 °F (36.8 °C) Oral 84 19 93 % -- --       Physical Exam  Vitals and nursing note reviewed. Constitutional:       General: She is not in acute distress. Appearance: She is well-developed. She is not ill-appearing, toxic-appearing or diaphoretic. HENT:      Head: Normocephalic and atraumatic. Eyes:      Conjunctiva/sclera: Conjunctivae normal.      Pupils: Pupils are equal, round, and reactive to light. Cardiovascular:      Rate and Rhythm: Normal rate and regular rhythm. Pulses: Normal pulses. Pulmonary:      Effort: Pulmonary effort is normal. No respiratory distress. Abdominal:      General: Bowel sounds are normal. There is no distension. Palpations: Abdomen is soft. Tenderness: There is no abdominal tenderness. Musculoskeletal:      Cervical back: Normal.      Thoracic back: Tenderness and bony tenderness present. Lumbar back: Tenderness and bony tenderness present. Back:       Right lower leg: No edema. Left lower leg: No edema. Skin:     General: Skin is warm and dry.    Neurological:      Mental Status: She is alert and oriented to person, place, and time. GCS: GCS eye subscore is 4. GCS verbal subscore is 5. GCS motor subscore is 6. Cranial Nerves: Cranial nerves are intact. Sensory: Sensation is intact. No sensory deficit. Psychiatric:         Behavior: Behavior normal. Behavior is cooperative. Thought Content: Thought content normal.         DIAGNOSTIC RESULTS   LABS:    Labs Reviewed   CBC WITH AUTO DIFFERENTIAL - Abnormal; Notable for the following components:       Result Value    RDW 18.3 (*)     All other components within normal limits   BASIC METABOLIC PANEL W/ REFLEX TO MG FOR LOW K - Abnormal; Notable for the following components:    Glucose 66 (*)     BUN 29 (*)     GFR Non- 52 (*)     All other components within normal limits   URINALYSIS WITH REFLEX TO CULTURE   POCT GLUCOSE       When ordered only abnormal lab results are displayed. All other labs were within normal range or not returned as of this dictation. EKG: When ordered, EKG's are interpreted by the Emergency Department Physician in the absence of a cardiologist.  Please see their note for interpretation of EKG. RADIOLOGY:   Non-plain film images such as CT, Ultrasound and MRI are read by the radiologist. Plain radiographic images are visualized and preliminarily interpreted by the ED Provider with the below findings:    Interpretation per the Radiologist below, if available at the time of this note:    CT THORACIC SPINE WO CONTRAST   Final Result   1. Findings of diffuse osseous metastatic disease are new/progressed from   the prior chest CT done April 19, 2022.      2.  Subacute T10 inferior endplate compression fracture with 25% height loss   may represent a pathologic fracture. No significant posterior fracture   retropulsion. 3.  Minimal T12 superior endplate height loss with subtle cortical defect   could represent an acute/subacute compression fracture.       4.  Nondisplaced fracture of the T10 spinous process. 5.  Subtle rounded 1.8 cm area of hypoattenuation in the hepatic dome raises   concern for metastatic disease and could be further evaluated with CT abdomen   pelvis. RECOMMENDATIONS:   Follow-up MRI may be helpful for further evaluation. CT LUMBAR SPINE WO CONTRAST   Final Result   1. Findings of diffuse osseous metastatic disease are new/progressed   compared to CT abdomen pelvis done April 19, 2022.      2.  No acute fracture in the lumbar spine. 3.  Multilevel degenerative changes in the lumbar spine. 4.  Grade 1 anterolisthesis of L5 on S1 secondary to chronic bilateral L5   pars interarticularis defects. No results found. PROCEDURES   Unless otherwise noted below, none     Procedures      CONSULTS:  IP CONSULT TO NEUROSURGERY  IP CONSULT TO HOSPITALIST      EMERGENCY DEPARTMENT COURSE and DIFFERENTIAL DIAGNOSIS/MDM:   Vitals:    Vitals:    07/09/22 1200 07/09/22 1215 07/09/22 1300 07/09/22 1315   BP: (!) 174/73 (!) 152/62 131/86 138/67   Pulse: 85 84 95 88   Resp: 19 20 17 19   Temp:       TempSrc:       SpO2: 91% 90% 92% 91%       Patient was given the following medications:  Medications   lidocaine 4 % external patch 1 patch (1 patch TransDERmal Patch Applied 7/9/22 1055)   cyclobenzaprine (FLEXERIL) tablet 5 mg (5 mg Oral Given 7/9/22 1057)   acetaminophen (TYLENOL) tablet 650 mg (650 mg Oral Given 7/9/22 1057)   0.9 % sodium chloride bolus (0 mLs IntraVENous Stopped 7/9/22 1228)         Is this patient to be included in the SEP-1 Core Measure due to severe sepsis or septic shock? No   Exclusion criteria - the patient is NOT to be included for SEP-1 Core Measure due to: Infection is not suspected    ED COURSE & MEDICAL DECISION MAKING    - The patient presented to the ER with complaints of back pain. Vital signs were reviewed. Exam as above. Peripheral IV placed. Labs, Imaging ordered.    - Pertinent Labs & Imaging studies reviewed. (See chart for details)   -  Patient seen and evaluated in the emergency department. -  Triage and nursing notes reviewed and incorporated. -  Old chart records reviewed and incorporated. -   I have seen and evaluated this patient with my supervising physician Eduardo Tan MD.  -  Differential diagnosis includes: Muscle spasm, fracture, dislocation, contusion, shingles, metastatic disease, other  -  Work-up included:  See above  -  ED treatment included:   Flexeril, Tylenol, lidocaine patch  - Consults: Neurosurgery, I spoke with Dr Marco Llamas who advised that the patient should be placed in a TLSO brace off-the-shelf, but he will call the people who provide the braces and get that sent to the hospital today. He also advised that on admission the patient should have an MRI with and without contrast, and likely a radiation oncology consult as well. Hospitalist was consulted for admission, and was advised of the recommendations from neurosurgery. -  Results discussed with patient and/or family. Labs show no leukocytosis or concerning anemia, metabolic panel with glucose of 66, BUN 29. She was given food and juice which she tolerated well. Imaging studies show findings to diffuse osseous metastatic disease that is new and progressed from prior chest CT that was done in April of the same year. She has a subacute T10 inferior endplate compression fracture with 25% height loss that may be a pathologic fracture, with no significant fracture or retropulsion. She has a minimal T12 superior endplate height loss with subtle cortical defect that could represent acute or subacute compression fracture, nondisplaced fracture of the spinous process of T10, and a subtle rounded 1.8 cm area in the hepatic dome that could be metastatic disease. CT of the lumbar spine again demonstrates diffuse osseous metastatic disease, with no acute fracture visualized.  Patient feels relatively comfortable at this time when she is laying still, however with movement she gets a lot of pain. Patient's daughter tells me that they met with hospice yesterday and are in the process of getting the house set up for her to come home and hospice care, but they do not yet have a hospital bed, and the pain medication that she was provided with yesterday has not helped improve her pain, and they are unable to get her up and move her about due to her pain. At this time, we recommend admission, as the patient has a lot of pain, would benefit from admission for pain control, and allowing for extra time to have her house set up to where hospice care can be provided. The patient and/or family is agreeable with plan of care and disposition.  -  Disposition:  Admission  - Critical Care: The total critical care time I independently spent while evaluating and treating this patient was 18 minutes. This excludes time spent doing separately billable procedures. This includes time at the bedside, data interpretation, medication management, obtaining critical history from collateral sources if the patient is unable to provide it directly, and physician consultation. Specifics of interventions taken and potentially life-threatening diagnostic considerations are listed above in the medical decision making. If this was a shared visit with an physician, the time in this attestation is non-concurrent critical care time out of the total shared critical care time provided by the physician and myself. FINAL IMPRESSION      1. Malignant neoplasm metastatic to bone ([address]    2. Compression fracture of T10 vertebra, initial encounter ([address]    3. Compression fracture of T12 vertebra, initial encounter ([address]    4. Closed fracture of spinous process of thoracic vertebra, initial encounter ([address]    5. Lesion of liver    6. Malignant neoplasm of female breast, unspecified estrogen receptor status, unspecified laterality, unspecified site of breast ([address]    7.  Acute low back pain without sciatica, unspecified back pain laterality          DISPOSITION/PLAN   DISPOSITION Decision To Admit 07/09/2022 02:07:19 PM      PATIENT REFERRED TO:  No follow-up provider specified.     DISCHARGE MEDICATIONS:  New Prescriptions    No medications on file       DISCONTINUED MEDICATIONS:  Discontinued Medications    No medications on file              (Please note that portions of this note were completed with a voice recognition program.  Efforts were made to edit the dictations but occasionally words are mis-transcribed.)    ORIANA Thomas (electronically signed)            Lars Thomas  07/09/22 [id number]

## 2022-07-09 NOTE — H&P
Hospital Medicine History & Physical      Patient:  Jesse Parks  :   1933  MRN:   [id number]  Date of Service: 22    Chief Complaint   Patient presents with    Back Pain     lower back pain for 3 days. Worse when moving. Currently has breast cancer and receiving chemotherapy. HISTORY OF PRESENT ILLNESS:    Jesse Parks is a 80 y.o. female. She presente dot the ER from home with low back pain. She has been having pain on and off for at least a week, but she has difficulty pinpointing time of onset. It has come and gone over time but overall seems to be getting worse. It is exacerbated by movement such as standing and walking. She denies any numbness, tingling, weakness, bowel/bladder control problems. She has a h/o right-sided triple negative invasive ductal carcinoma of the breast.  This was originally stage III C at diagnosis which was in . She has been following up with oncologist, Dr. Serge Watkins, and surgeon, Dr. Talha Escobedo. She is receiving neoadjuvant Keytruda, carboplatin, and paclitaxel. Her last infusion was about a month ago. Widespread bony metastases were discovered on CT scan performed in the ER. There is evidence of a subacute pathologic fracture of the body of T10 with 25% height loss. She is accompanied by her daughter. The patient enrolled in hospice yesterday even before finding out today that her breast cancer is stage IV. She does not have a hospital bed at home yet. Review of Systems:  All pertinent positives and negatives are as noted in the HPI section. All other systems were reviewed and are negative.     Past Medical History:   Diagnosis Date    Cancer [facility])     breast cancer    Diverticulitis     Glaucoma     Macular degeneration        Past Surgical History:   Procedure Laterality Date    BLADDER SUSPENSION      COLONOSCOPY      IR PORT PLACEMENT EQUAL OR GREATER THAN 5 YEARS Left 2022    Power Port; [employer] 20 --   07/09/22 1130 (!) 155/85 -- -- 83 18 90 %   07/09/22 1117 (!) 151/76 -- -- 89 15 93 %   07/09/22 1100 131/82 -- -- 88 (!) 35 --   07/09/22 1045 111/72 -- -- 83 20 --   07/09/22 0950 (!) 160/64 98 °F (36.7 °C) Oral 87 19 93 %   07/09/22 0945 (!) 160/64 98.3 °F (36.8 °C) Oral 84 19 93 %       Intake/Output Summary (Last 24 hours) at 7/9/2022 1414  Last data filed at 7/9/2022 1228  Gross per 24 hour   Intake 501.12 ml   Output --   Net 501.12 ml     Room air  '  GEN:  Appearance:  Elderly female in NAD . Level of Consciousness:  alert . Orientation:  full    HEENT: Sclera anicteric.  no conjunctival chemosis. moist mucus membranes. no specific or diagnostic oral lesions. NECK:  no signs of meningismus. Jugular veins not distended. Carotid pulses  2+.  no cervical lymphadenopathy. no thyromegaly. CV:  regular rhythm. normal S1 & S2.    no murmur. no rub.  no gallop. PULM:  Chest excursion is symmetric. Breath sounds are vesicular. Adventitious sounds:  none    AB:  Abdominal shape is normal.  Bowel sounds are active. Generally soft to palpation. no tenderness is present. no involuntary guarding. no rebound guarding. EXTR:  Skin is warm. Capillary refill brisk. no specific or pathognomic rash. no clubbing. no pitting edema. no active wound or ulcer.   Pulses 2+ x 4    NEURO: Intact    LABS:  Lab Results   Component Value Date    WBC 8.8 07/09/2022    HGB 12.7 07/09/2022    HCT 37.9 07/09/2022    MCV 80.6 07/09/2022     04/26/2022     Lab Results   Component Value Date    CREATININE 1.0 07/09/2022    BUN 29 (H) 07/09/2022     07/09/2022    K 4.3 07/09/2022     07/09/2022    CO2 23 07/09/2022     Lab Results   Component Value Date    ALT 25 04/03/2022    AST 19 04/03/2022    ALKPHOS 94 04/03/2022    BILITOT 0.4 04/03/2022     Lab Results   Component Value Date    TROPONINI <0.01 04/03/2022     No results for input(s): PHART, IPA0EZL, PO2ART in the last 72 hours. IMAGING:  XR CHEST (2 VW)    Result Date: 6/23/2022  EXAMINATION: TWO XRAY VIEWS OF THE CHEST 6/22/2022 3:23 pm COMPARISON: 04/03/2022 HISTORY: ORDERING SYSTEM PROVIDED HISTORY: Dyspnea and respiratory abnormality TECHNOLOGIST PROVIDED HISTORY: Reason for Exam: Dyspnea and respiratory abnormality FINDINGS: Heart size is normal.  Mediastinal contours are normal.  Pulmonary vascularity is normal. A few bandlike opacities are seen at the lung bases, likely subsegmental atelectasis. No focal lung consolidation is noted. Left-sided MediPort is seen. Tip projects at the level of the cavoatrial junction. .  Atherosclerotic change is seen in aorta. Bandlike opacities are seen at the lung bases, likely subsegmental atelectasis or scar. No pneumonia or edema     CT THORACIC SPINE WO CONTRAST    Result Date: 7/9/2022  EXAMINATION: CT OF THE THORACIC SPINE WITHOUT CONTRAST  7/9/2022 11:10 am: TECHNIQUE: CT of the thoracic spine was performed without the administration of intravenous contrast. Multiplanar reformatted images are provided for review. Automated exposure control, iterative reconstruction, and/or weight based adjustment of the mA/kV was utilized to reduce the radiation dose to as low as reasonably achievable. COMPARISON: Chest CT done 04/19/2022. chest radiographs done June 22, 2022. HISTORY: ORDERING SYSTEM PROVIDED HISTORY: Low back pain, NKI, being tx for breast CA TECHNOLOGIST PROVIDED HISTORY: Reason for exam:->Low back pain, NKI, being tx for breast CA Reason for Exam: Low back pain, NKI, being tx for breast CA FINDINGS: BONES/ALIGNMENT: Subacute T10 inferior endplate compression fracture with 25% height loss and osseous sclerosis is similar compared to chest radiographs done June 22, 2022. No significant posterior fracture retropulsion. Nondisplaced fracture of the T10 spinous process. Minimal T12 superior endplate height loss with subtle cortical defect.   The other thoracic vertebral body heights are maintained. No at the evidence of fracture in the thoracic spine. Findings of diffuse osseous metastatic disease with numerous scattered areas of osseous lucency and sclerosis throughout. Findings are new/progressed from the prior chest CT. DEGENERATIVE CHANGES: Multilevel degenerative disc and facet joint disease. No gross spinal canal stenosis or bony neural foraminal narrowing of the thoracic spine. SOFT TISSUES: No paraspinal mass is seen. Atherosclerosis. Coronary artery calcifications. Bilateral peripelvic renal cysts versus pelvicaliectasis. Subtle rounded 1.8 cm area of hypoattenuation in the hepatic dome. Left IJ port with tip near the superior atrial caval junction. Trace right greater than left pleural fluid. 1.  Findings of diffuse osseous metastatic disease are new/progressed from the prior chest CT done April 19, 2022. 2.  Subacute T10 inferior endplate compression fracture with 25% height loss may represent a pathologic fracture. No significant posterior fracture retropulsion. 3.  Minimal T12 superior endplate height loss with subtle cortical defect could represent an acute/subacute compression fracture. 4.  Nondisplaced fracture of the T10 spinous process. 5.  Subtle rounded 1.8 cm area of hypoattenuation in the hepatic dome raises concern for metastatic disease and could be further evaluated with CT abdomen pelvis. RECOMMENDATIONS: Follow-up MRI may be helpful for further evaluation. CT LUMBAR SPINE WO CONTRAST    Result Date: 7/9/2022  EXAMINATION: CT OF THE LUMBAR SPINE WITHOUT CONTRAST  7/9/2022 TECHNIQUE: CT of the lumbar spine was performed without the administration of intravenous contrast. Multiplanar reformatted images are provided for review. Adjustment of mA and/or kV according to patient size was utilized.   Automated exposure control, iterative reconstruction, and/or weight based adjustment of the mA/kV was utilized to reduce the radiation dose to as low as reasonably achievable. COMPARISON: CT abdomen pelvis done April 19, 2022. HISTORY: ORDERING SYSTEM PROVIDED HISTORY: Low back pain, NKI, being tx for breast CA TECHNOLOGIST PROVIDED HISTORY: Reason for exam:->Low back pain, NKI, being tx for breast CA Decision Support Exception - unselect if not a suspected or confirmed emergency medical condition->Emergency Medical Condition (MA) Reason for Exam: Low back pain, NKI, being tx for breast CA FINDINGS: BONES/ALIGNMENT: Findings of diffuse osseous metastatic disease with numerous sclerotic/lucent lesions throughout the visualized osseous structures. For example, there is a rounded mixed sclerotic and lucent lesion in the right L2 vertebral body which measures 2.3 cm. No acute fracture. Grade 1 anterolisthesis of L5 on S1 measures 8 mm. Chronic bilateral L5 pars interarticularis defects. Grade 1 degenerative anterolisthesis of L4 on L5 measures 3 mm. No significant listhesis at the other levels. DEGENERATIVE CHANGES: Multilevel degenerative disc and facet joint disease. Multilevel disc bulges. Mild-to-moderate L3-L4 and L4-L5 spinal canal stenosis secondary to disc bulges, facet hypertrophy and ligamentum flavum thickening multilevel neural foraminal narrowing is most pronounced and severe bilaterally at L5-S1. SOFT TISSUES/RETROPERITONEUM: No paraspinal mass is seen. Atherosclerosis. 1.  Findings of diffuse osseous metastatic disease are new/progressed compared to CT abdomen pelvis done April 19, 2022. 2.  No acute fracture in the lumbar spine. 3.  Multilevel degenerative changes in the lumbar spine. 4.  Grade 1 anterolisthesis of L5 on S1 secondary to chronic bilateral L5 pars interarticularis defects. I directly reviewed all recent imaging studies as well as pertinent prior studies. Radiology reports may or may not be available at the time of my review.         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 TLSO brace has been requested. Will need a plan for palliative spinal radiation. Radiation oncology input requested. MRI of the whole spine w/ and w/o contrast planned. -  Pain regimen will start with oxycodone SR 10mg q12h and oxycodone IR 5mg q4h prn.  IV dilaudid made available for breatkthrough pain. Patient also requested a muscle relaxer to robaxin 1000mg QID prn started. Will need a bowel regimen. Continue home psyllium and start nightly senna. Stage IV Breast Cancer  -  Right breast.  Invasive ductal carcinoma. -  Patient is enrolled in hospice. DVT prophylaxis: SCDs. lovenox  Code Status:  [facility]  Disposition:  Inpatient w/ anticipate d/c back to home with home hospice care.     Vince Carlos MD MD

## 2022-07-10 LAB
BACTERIA: NORMAL /HPF
BILIRUBIN URINE: NEGATIVE
BLOOD, URINE: NEGATIVE
CLARITY: CLEAR
COLOR: ABNORMAL
EPITHELIAL CELLS, UA: 3 /HPF (ref 0–5)
GLUCOSE URINE: NEGATIVE MG/DL
HYALINE CASTS: 4 /LPF (ref 0–8)
KETONES, URINE: 15 MG/DL
LEUKOCYTE ESTERASE, URINE: NEGATIVE
MICROSCOPIC EXAMINATION: YES
NITRITE, URINE: NEGATIVE
PH UA: 5.5 (ref 5–8)
PROTEIN UA: 30 MG/DL
RBC UA: 2 /HPF (ref 0–4)
SPECIFIC GRAVITY UA: 1.03 (ref 1–1.03)
URINE REFLEX TO CULTURE: ABNORMAL
URINE TYPE: ABNORMAL
UROBILINOGEN, URINE: 0.2 E.U./DL
WBC UA: 3 /HPF (ref 0–5)

## 2022-07-10 PROCEDURE — 6370000000 HC RX 637 (ALT 250 FOR IP): Performed by: INTERNAL MEDICINE

## 2022-07-10 PROCEDURE — 6360000002 HC RX W HCPCS: Performed by: INTERNAL MEDICINE

## 2022-07-10 PROCEDURE — 94760 N-INVAS EAR/PLS OXIMETRY 1: CPT

## 2022-07-10 PROCEDURE — 51798 US URINE CAPACITY MEASURE: CPT

## 2022-07-10 PROCEDURE — 1200000000 HC SEMI PRIVATE

## 2022-07-10 PROCEDURE — 81001 URINALYSIS AUTO W/SCOPE: CPT

## 2022-07-10 PROCEDURE — 6370000000 HC RX 637 (ALT 250 FOR IP): Performed by: NURSE PRACTITIONER

## 2022-07-10 RX ORDER — OXYCODONE HCL 10 MG/1
10 TABLET, FILM COATED, EXTENDED RELEASE ORAL ONCE
Status: COMPLETED | OUTPATIENT
Start: 2022-07-10 | End: 2022-07-10

## 2022-07-10 RX ORDER — DEXAMETHASONE SODIUM PHOSPHATE 4 MG/ML
4 INJECTION, SOLUTION INTRA-ARTICULAR; INTRALESIONAL; INTRAMUSCULAR; INTRAVENOUS; SOFT TISSUE EVERY 6 HOURS
Status: DISCONTINUED | OUTPATIENT
Start: 2022-07-10 | End: 2022-07-14 | Stop reason: HOSPADM

## 2022-07-10 RX ORDER — OXYCODONE HCL 10 MG/1
20 TABLET, FILM COATED, EXTENDED RELEASE ORAL EVERY 12 HOURS SCHEDULED
Status: DISCONTINUED | OUTPATIENT
Start: 2022-07-10 | End: 2022-07-14 | Stop reason: HOSPADM

## 2022-07-10 RX ADMIN — HYDROMORPHONE HYDROCHLORIDE 0.5 MG: 1 INJECTION, SOLUTION INTRAMUSCULAR; INTRAVENOUS; SUBCUTANEOUS at 07:00

## 2022-07-10 RX ADMIN — OXYCODONE HYDROCHLORIDE 10 MG: 10 TABLET, FILM COATED, EXTENDED RELEASE ORAL at 17:14

## 2022-07-10 RX ADMIN — DORZOLAMIDE HYDROCHLORIDE AND TIMOLOL MALEATE 1 DROP: 20; 5 SOLUTION/ DROPS OPHTHALMIC at 09:25

## 2022-07-10 RX ADMIN — HYDROMORPHONE HYDROCHLORIDE 0.5 MG: 1 INJECTION, SOLUTION INTRAMUSCULAR; INTRAVENOUS; SUBCUTANEOUS at 11:43

## 2022-07-10 RX ADMIN — OXYCODONE HYDROCHLORIDE 10 MG: 10 TABLET, FILM COATED, EXTENDED RELEASE ORAL at 09:21

## 2022-07-10 RX ADMIN — OXYCODONE HYDROCHLORIDE 20 MG: 10 TABLET, FILM COATED, EXTENDED RELEASE ORAL at 21:46

## 2022-07-10 RX ADMIN — OXYCODONE 5 MG: 5 TABLET ORAL at 05:57

## 2022-07-10 RX ADMIN — THERA TABS 1 TABLET: TAB at 09:22

## 2022-07-10 RX ADMIN — HYDROMORPHONE HYDROCHLORIDE 0.5 MG: 1 INJECTION, SOLUTION INTRAMUSCULAR; INTRAVENOUS; SUBCUTANEOUS at 00:00

## 2022-07-10 RX ADMIN — DORZOLAMIDE HYDROCHLORIDE AND TIMOLOL MALEATE 1 DROP: 20; 5 SOLUTION/ DROPS OPHTHALMIC at 21:53

## 2022-07-10 RX ADMIN — DEXAMETHASONE SODIUM PHOSPHATE 4 MG: 4 INJECTION, SOLUTION INTRAMUSCULAR; INTRAVENOUS at 17:31

## 2022-07-10 RX ADMIN — HYDROMORPHONE HYDROCHLORIDE 0.5 MG: 1 INJECTION, SOLUTION INTRAMUSCULAR; INTRAVENOUS; SUBCUTANEOUS at 03:07

## 2022-07-10 RX ADMIN — PSYLLIUM HUSK 1 PACKET: 3.4 GRANULE ORAL at 09:24

## 2022-07-10 RX ADMIN — SENNOSIDES 8.6 MG: 8.6 TABLET, FILM COATED ORAL at 21:47

## 2022-07-10 RX ADMIN — DEXAMETHASONE SODIUM PHOSPHATE 4 MG: 4 INJECTION, SOLUTION INTRAMUSCULAR; INTRAVENOUS at 13:37

## 2022-07-10 ASSESSMENT — PAIN DESCRIPTION - FREQUENCY
FREQUENCY: CONTINUOUS

## 2022-07-10 ASSESSMENT — PAIN SCALES - GENERAL
PAINLEVEL_OUTOF10: 5
PAINLEVEL_OUTOF10: 2
PAINLEVEL_OUTOF10: 5
PAINLEVEL_OUTOF10: 5
PAINLEVEL_OUTOF10: 8
PAINLEVEL_OUTOF10: 7
PAINLEVEL_OUTOF10: 6
PAINLEVEL_OUTOF10: 7
PAINLEVEL_OUTOF10: 0
PAINLEVEL_OUTOF10: 0
PAINLEVEL_OUTOF10: 7
PAINLEVEL_OUTOF10: 8
PAINLEVEL_OUTOF10: 8

## 2022-07-10 ASSESSMENT — PAIN DESCRIPTION - DESCRIPTORS
DESCRIPTORS: PENETRATING
DESCRIPTORS: ACHING;SPASM
DESCRIPTORS: ACHING

## 2022-07-10 ASSESSMENT — PAIN DESCRIPTION - PAIN TYPE
TYPE: ACUTE PAIN

## 2022-07-10 ASSESSMENT — PAIN DESCRIPTION - LOCATION
LOCATION: BACK

## 2022-07-10 ASSESSMENT — PAIN DESCRIPTION - ORIENTATION
ORIENTATION: LOWER;MID
ORIENTATION: LOWER
ORIENTATION: MID;LOWER
ORIENTATION: LOWER;MID

## 2022-07-10 ASSESSMENT — PAIN - FUNCTIONAL ASSESSMENT
PAIN_FUNCTIONAL_ASSESSMENT: PREVENTS OR INTERFERES SOME ACTIVE ACTIVITIES AND ADLS
PAIN_FUNCTIONAL_ASSESSMENT: INTOLERABLE, UNABLE TO DO ANY ACTIVE OR PASSIVE ACTIVITIES
PAIN_FUNCTIONAL_ASSESSMENT: PREVENTS OR INTERFERES SOME ACTIVE ACTIVITIES AND ADLS

## 2022-07-10 ASSESSMENT — PAIN DESCRIPTION - ONSET
ONSET: ON-GOING

## 2022-07-10 NOTE — PROGRESS NOTES
Patient A&O. Pt family at bedside. Pt denies need for pain meds at this time. Back brace off because pt is sitting up in bed. Call light within reach, able to make needs knows, fall precautions in place. Will continue to monitor.  Electronically signed by Uri Howard RN on 7/10/2022 at 6:41 PM

## 2022-07-10 NOTE — PLAN OF CARE
Problem: Discharge Planning  Goal: Discharge to home or other facility with appropriate resources  7/10/2022 0704 by Kandee Landau, RN  Outcome: Progressing  Flowsheets (Taken 7/10/2022 0704)  Discharge to home or other facility with appropriate resources:   Identify barriers to discharge with patient and caregiver   Arrange for needed discharge resources and transportation as appropriate   Identify discharge learning needs (meds, wound care, etc)     Problem: Pain  Goal: Verbalizes/displays adequate comfort level or baseline comfort level  7/10/2022 0704 by Kandee Landau, RN  Outcome: Progressing  Flowsheets (Taken 7/10/2022 0704)  Verbalizes/displays adequate comfort level or baseline comfort level:   Encourage patient to monitor pain and request assistance   Assess pain using appropriate pain scale   Administer analgesics based on type and severity of pain and evaluate response     Problem: Skin/Tissue Integrity  Goal: Absence of new skin breakdown  Description: 1. Monitor for areas of redness and/or skin breakdown  2. Assess vascular access sites hourly  3. Every 4-6 hours minimum:  Change oxygen saturation probe site  4. Every 4-6 hours:  If on nasal continuous positive airway pressure, respiratory therapy assess nares and determine need for appliance change or resting period. Outcome: Progressing  Note: Pt assessed for skin break down. Skin was warm and dry to touch. Pt cannot regulate head of bed without assistance. Pt being turned or repositioned at least every 2 hours to prevent skin breakdown. Will continue to monitor and assess.         Problem: Safety - Adult  Goal: Free from fall injury  Outcome: Progressing  Flowsheets (Taken 7/10/2022 0704)  Free From Fall Injury: Instruct family/caregiver on patient safety     Problem: ABCDS Injury Assessment  Goal: Absence of physical injury  Outcome: Progressing  Flowsheets (Taken 7/10/2022 0704)  Absence of Physical Injury: Implement safety measures based on patient assessment

## 2022-07-10 NOTE — FLOWSHEET NOTE
07/09/22 2055   Assessment   Charting Type Shift assessment   Psychosocial   Psychosocial (WDL) X   Patient Behaviors Withdrawn   Family Behaviors Anxious; Supportive; Hyper-vigilant   Rest/Sleep for Patient Poor   Neurological   Neuro (WDL) X   Level of Consciousness Responds to voice (1)   Orientation Level Oriented to person;Oriented to place; Disoriented to time;Disoriented to situation   Cognition Follows commands   Speech Clear   R Pupil Size (mm) 1   R Pupil Shape Round   R Pupil Reaction Brisk   L Pupil Size (mm) 1   L Pupil Shape Round   L Pupil Reaction Brisk   R Hand  Weak   L Hand  Weak   R Foot Dorsiflexion Weak   L Foot Dorsiflexion Weak   R Foot Plantar Flexion Weak   L Foot Plantar Flexion Weak   RUE Motor Response Responds to command   RUE Sensation  Full sensation   LUE Motor Response Responds to command   LUE Sensation  Full sensation   RLE Motor Response Responds to command   RLE Sensation  Numbness;Pain;Tingling; Full sensation   LLE Motor Response Responds to command   LLE Sensation  Numbness;Pain;Tingling; Full sensation   Gag Present   Praveen Coma Scale   Eye Opening 4   Best Verbal Response 4   Best Motor Response 6   Praveen Coma Scale Score 14   HEENT (Head, Ears, Eyes, Nose, & Throat)   HEENT (WDL) X   Right Eye Impaired vision   Left Eye Impaired vision   Right Ear Impaired hearing   Left Ear Impaired hearing   Tongue Dry   Voice Raspy   Mucous Membrane Dry   Teeth Dentures upper;Dentures lower;Missing teeth   Respiratory   Respiratory (WDL) X   Respiratory Pattern Regular   Respiratory Depth Normal   Respiratory Quality/Effort Dyspnea with exertion   Chest Assessment Chest expansion symmetrical;Trachea midline   L Breath Sounds Clear;Diminished   R Breath Sounds Clear;Diminished   Level of Activity/Mobility 2   Subcutaneous Air/Crepitus None   Cardiac   Cardiac (WDL) X  (mildly tachacardic at times )   Cardiac Regularity Regular   Heart Sounds S1, S2   Rhythm Interpretation Heart Rate 99   Gastrointestinal   Abdominal (WDL) WDL   Pre Hospital Interventions Stool softener   Genitourinary   Genitourinary (WDL) X  (extrenal female catheter )   Suprapubic Tenderness No   Dysuria (Pain/Burning w/Urination) No   Peripheral Vascular   Peripheral Vascular (WDL) X   Edema Generalized   Skin Integumentary    Skin Integumentary (WDL) WDL   Care Plan - Skin/Tissue Integrity Goals   Skin Integrity Remains Intact Monitor for areas of redness and/or skin breakdown   Musculoskeletal   Musculoskeletal (WDL) X  (spinal fractures )   RUE Weakness   LUE Weakness   RL Extremity Weakness   LL Extremity Weakness   RASS   Antonio Agitation Sedation Scale (RASS) 0

## 2022-07-10 NOTE — CONSULTS
Oncology Consult    See dictation    A/P:  1. Mets breast cancer/back pain. Her cancer is now widely metastatic. She and her family do not want more chemo. Will consult hospice. In terms of the back pain, she does not want radiation or kyphoplasty. Will increase oxycontin and dilaudid dose and start IV steroids. Thanks for consult. Will follow closely.     Rachel Proctor MD

## 2022-07-10 NOTE — CARE COORDINATION
Per bedside Rn, patient has a hospice order and the family would like [facility]. Referral made to Awa Alcaraz at [facility]. Per Awa Alcaraz, patient was recently a [facility] patient but will have to be reassessed for hospice. Referral also faxed. Rn updated. Electronically signed by JOHN Flannery, FIDEL, Case Management 7/10/2022 at 3:06 PM  [phone]    3:59 PM  Received call from Rn, family met with hospice and aware that patient is not candidate for inpatient hospice  at this time and would like nursing facility placement for patient. Spoke with patient's daughter, Jaydon Jerome, regarding above. Explained skilled and long term nursing home care, medicare and private pay. Jaydon Jerome reported that she will need to discuss with her family and follow up with Sw tomorrow.     Electronically signed by JOHN Davis, FIDEL, Case Management on 7/10/2022 at 4:03 PM  [place] [phone] Yes

## 2022-07-10 NOTE — PROGRESS NOTES
Patient unable to void overnight. Bladder scan shows 450 mls. Patient is attempting to void on the bedpan now.

## 2022-07-10 NOTE — PROGRESS NOTES
Sheth catheter inserted per request of family at the recommendation of Dr. Alana Riedel for comfort/end of life care.  Electronically signed by Michael Villafuerte RN on 7/10/2022 at 2:02 PM

## 2022-07-10 NOTE — PROGRESS NOTES
Hospitalist Progress Note      PCP: Audi Rea DO    Date of Admission: 7/9/2022    Chief Complaint: Low back pain    Hospital Course: Kavon Devi is a 80 y.o. female. She presente dot the ER from home with low back pain. She has been having pain on and off for at least a week, but she has difficulty pinpointing time of onset. It has come and gone over time but overall seems to be getting worse. It is exacerbated by movement such as standing and walking. She denies any numbness, tingling, weakness, bowel/bladder control problems.       She has a h/o right-sided triple negative invasive ductal carcinoma of the breast.  This was originally stage III C at diagnosis which was in April, 2022. She has been following up with oncologist, Dr. Jerman Burnett, and surgeon, Dr. Martha Ríos. She is receiving neoadjuvant Keytruda, carboplatin, and paclitaxel. Her last infusion was about a month ago.     Widespread bony metastases were discovered on CT scan performed in the ER. There is evidence of a subacute pathologic fracture of the body of T10 with 25% height loss.     She is accompanied by her daughter. The patient enrolled in hospice yesterday even before finding out today that her breast cancer is stage IV. She does not have a hospital bed at home yet.       Subjective: Patient laying in bed with TLSO brace on. States she is comfortable currently. Recently received increase in oxycodone. Family at bedside, questions answered. Was noted patient is coughing slightly after swallowing. Offered a swallow eval and patient's daughters declined. They stated \"we just want to keep mom comfortable. We do not want any further testing or work-up. She has suffered enough. Hospice is coming to meet with us around 2:00 today. \"  Explained to the family that patient is not actively dying at this time, as vital signs are stable. But will continue to remain in hospital until pain is better controlled.   They were appreciative of this. Assessment/Plan:    Cancer associated intractable back pain with bony metastasis and T10 pathological fracture  -Neurosurgery consulted. Recommended TLSO brace, MRIs of the entire spine, possible kyphoplasty, and possibly palliative radiation  -Oncology evaluated the patient and met with the family. Family does not want any further work-up, they only want pain management. Patient in agreement to this. Therefore they do not want palliative radiation.  -The patient wanted hospice care, hospice consult was placed. -Oxycodone ER increased to 20 mg twice daily. Continue with oxycodone cotton IR as needed as well as IV Dilaudid as needed. -IV Decadron initiated by oncology. -CODE STATUS is DNR CC.  -We will cancel MRIs that were ordered as patient and family do not want any further work-up. Stage IV breast cancer  -Hospice consulted. Family met with hospice and aware patient is not a candidate for inpatient hospice at this time and would like nursing facility placement for patient. Social work discussed with patient's daughter, she needs to discuss this with the rest of the family we will follow-up with social work tomorrow on their decision. Dysphagia  -Patient noted with coughing after swallowing. Offered a swallow eval to patient and family, they declined. Explained risk of aspiration, they verbalized understanding. Suggested to crush meds and put in applesauce, family and patient reluctant to do this.     Active Hospital Problems    Diagnosis     Cancer associated pain [G89.3]      Priority: Medium    Breast cancer, stage 4, right (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 [M84.58XA]        Medications:  Reviewed    Infusion Medications    sodium chloride       Scheduled Medications    oxyCODONE  20 mg Oral 2 times per day    dexamethasone  4 mg IntraVENous Q6H    lidocaine  1 patch TransDERmal Daily    psyllium  1 packet Oral Daily    multivitamin  1 tablet Oral Daily    latanoprost  1 drop Both Eyes Nightly    dorzolamide-timolol  1 drop Both Eyes BID    enoxaparin  40 mg SubCUTAneous Daily    senna  1 tablet Oral Nightly     PRN Meds: HYDROmorphone, oxyCODONE, sodium chloride flush, sodium chloride, potassium chloride **OR** potassium alternative oral replacement **OR** potassium chloride, magnesium sulfate, ondansetron **OR** ondansetron, acetaminophen **OR** acetaminophen, melatonin, calcium carbonate, methocarbamol      Intake/Output Summary (Last 24 hours) at 7/10/2022 1400  Last data filed at 7/10/2022 0706  Gross per 24 hour   Intake 50 ml   Output 0 ml   Net 50 ml       Physical Exam Performed:    BP (!) 142/73   Pulse (!) 116   Temp 98.2 °F (36.8 °C) (Oral)   Resp 16   Ht 5' 3\" (1.6 m)   Wt 135 lb 5.8 oz (61.4 kg)   SpO2 93%   BMI 23.98 kg/m²     General appearance: No apparent distress, appears stated age and cooperative. Appears weak fatigued  HEENT: Pupils equal, round, and reactive to light. Conjunctivae/corneas clear. Neck: Supple, with full range of motion. No jugular venous distention. Trachea midline. Respiratory:  Normal respiratory effort. Clear to auscultation, bilaterally without Rales/Wheezes/Rhonchi. Cardiovascular: Regular rate and rhythm with normal S1/S2 without murmurs, rubs or gallops. Abdomen: Soft, non-tender, non-distended with normal bowel sounds. Musculoskeletal: No clubbing, cyanosis or edema bilaterally. Full range of motion without deformity. Skin: Skin color, texture, turgor normal.  No rashes or lesions. Neurologic:  Neurovascularly intact without any focal sensory/motor deficits.  Cranial nerves: II-XII intact, grossly non-focal.  Psychiatric: Alert and oriented, thought content appropriate, normal insight  Capillary Refill: Brisk,< 3 seconds   Peripheral Pulses: +2 palpable, equal bilaterally       Labs:   Recent Labs     07/09/22  1115   WBC 8.8   HGB Prophylaxis: Lovenox  Diet: ADULT DIET; Regular  Code Status: DNR-CC    PT/OT Eval Status: No acute needs    Dispo -home once medically stable    Hannah Zuñiga, APRN - CNP      NOTE:  This report was transcribed using voice recognition software. Every effort was made to ensure accuracy; however, inadvertent computerized transcription errors may be present.

## 2022-07-10 NOTE — PLAN OF CARE
Problem: Discharge Planning  Goal: Discharge to home or other facility with appropriate resources  7/10/2022 0402 by Harper Gilbert RN  Outcome: Progressing  7/10/2022 0401 by Harper Gilbert RN  Outcome: Progressing         Problem: Pain  Goal: Verbalizes/displays adequate comfort level or baseline comfort level  7/10/2022 0402 by Harper Gilbert RN  Outcome: Progressing  7/10/2022 0401 by Harper Gilbert RN  Outcome: Progressing

## 2022-07-10 NOTE — PROGRESS NOTES
Pt has been coughing with PO intake. Reached out to MD to ask if pt needed a swallow eval. MD states that swallow eval was offered to family but family declined.  Electronically signed by Wang Sanchez RN on 7/10/2022 at 5:22 PM

## 2022-07-10 NOTE — PROGRESS NOTES
Patient is alert & oriented x4 but is a little forgetful a times. Family states patient is not forgetful at baseline but that she gets a little more confused when she is on pain medication. Morning medications given without complications. Pt had an episode of coughing this morning when taking her pills. Her daughter is at bedside and states the patient has been coughing a lot more frequently with oral intake. Pt is on bedrest 2/4 bed rails up, bed in lowest position, fall precautions in place, call light within reach. Back brace is on, offered to take it off patient while she is lying in bed but she states she wants to keep it on. Will cont to monitor and reassess.  Electronically signed by Hermann Bingham RN on 7/10/2022 at 10:06 AM

## 2022-07-10 NOTE — CONSULTS
[address] [facility]                                  CONSULTATION    PATIENT NAME: Lloyd Stephens                    :        1933  MED REC NO:   [id number]                          ROOM:       0881  ACCOUNT NO:   [de-identified]                           ADMIT DATE: 2022  PROVIDER:     Kye Bruno MD    ONCOLOGY CONSULTATION    CONSULT DATE:  07/10/2022    REASON FOR CONSULTATION:  Breast cancer with back pain. CONSULTING PROVIDER:  Magalis Lang MD    HISTORY OF PRESENT ILLNESS:  The patient is an 44-year-old female with  locally advanced triple negative right-sided breast cancer followed by  my partner, Dr. Terrance Vasquez, who presented to the hospital with a  three-day history of progressing low back pain. She denies any leg  weakness or incontinence. It only hurts when she moves but when she  does move, she has severe pain. A CT of the thoracic and lumbar spine  were done in the emergency room. This showed a subacute T10 compression  fracture and multiple new bony lesions throughout the spine. There was  also a new lesion in the hepatic dome of the liver but a CT of the  abdomen and pelvis was not done as of yet. This diffuse new metastatic  disease is all new since her CT scan in 2022. Neurosurgery was  consulted and recommended a TLSO brace which has been placed. Radiation  Oncology has also been consulted. PAST MEDICAL HISTORY:  1.  Locally-advanced right-sided triple negative breast cancer,  diagnosed in 2022. She was started on weekly carboplatin and Taxol  with Keytruda. She has not received any treatment since  due to  side-effects and increasing weakness. 2.  Diverticulitis. 3.  Glaucoma. 4.  Macular degeneration. PAST SURGICAL HISTORY:  Bladder suspension surgery.     ALLERGIES:  She is allergic to MORPHINE and SULFA which cause unknown  reactions. MEDICATIONS:  Her home medicines are multivitamin one p.o. daily,  eyedrops daily, and vitamin D daily. SOCIAL HISTORY:  She is . She does not drink or smoke. She is  retired. FAMILY HISTORY:  There is no family history of cancers that she is aware  of. REVIEW OF SYSTEMS:  She denies any recent fevers, chills, sweats,  nausea, vomiting, abdominal pain, chest pain, headaches, dysphagia,  odynophagia, diarrhea, constipation, hemoptysis, hematemesis, change in  vision/hearing/smell/taste, neuropathy, skin rashes, productive cough,  urinary or bowel prolapse or incontinence, petechiae, purpura, skin  rashes, pruritus, hallucinations, nasal congestion or drainage,  seizures, strokes, syncope, depression, anxiety, suicidal ideations,  melena, or hematochezia. She has mild to moderate fatigue. She has  severe low back pain. Her 10-system review of systems is otherwise  negative. PHYSICAL EXAMINATION:  VITAL SIGNS:  She is afebrile with normal vital signs. GENERAL:  She is in no acute distress. HEENT:  Her pupils are round and reactive to light and accommodation. Extraocular muscles are intact. NECK:  She has no jugular venous distention. No thyromegaly. Oropharynx is clear. She has no carotid bruits. She has no palpable  lymphadenopathy. HEART:  Regular rate and rhythm. LUNGS:  Clear to auscultation bilaterally. ABDOMEN:  Nondistended, nontender with bowel sounds x4. No  hepatosplenomegaly. EXTREMITIES:  She has no peripheral clubbing, cyanosis, or edema. NEUROLOGIC:  Nonfocal.    LABORATORY DATA:  Her white blood cell count is 8.8, hemoglobin 12.7,  platelets of 86. ASSESSMENT AND PLAN:  1. Metastatic breast cancer. I reviewed her CT scan in great detail  with two of her daughters and her son and the patient. I explained that  she now has widely metastatic disease that is new. She previously had  locally advanced disease.   Unfortunately, she was not doing very well on  chemotherapy even in very low doses. She has not received chemotherapy  in over a month. Now that she has metastatic disease, the main option  now would be palliative chemotherapy versus hospice. I reviewed  multiple other chemotherapy options that can sometimes be well tolerated  but she unfortunately has not done well with chemo. She is adamant that  she does not want any further chemotherapy. Therefore, she is  interested in hospice care. I agree that hospice care is appropriate. I will make a referral.  In terms of pain control options, there are  several options. One is radiation therapy. She and her family are very  concerned about radiation and its potential side effects and do not want  to go through the constant movements to get radiation treatments since  that will likely worsen the pain. I also mentioned kyphoplasty and went  into that option in great detail but they are adamant against that as  well. They would like to focus on pain control only. Therefore, I will  increase her OxyContin to 20 mg twice a day. She is allergic to  MORPHINE. I will increase her Dilaudid as needed dose. I will also  start IV steroids for now. She is wearing a TLSO brace which is  helping. Neurosurgery has seen her and does not recommend any surgical  intervention. She has no signs or symptoms of cord compression. Her  life expectancy in light of her ongoing decline is likely in the two to  eight-week range. She is not eating or drinking very much at all at  this point. She is somewhat lethargic but alert. 2.  DNRCC. Thank you for the consultation. We will follow closely. Ellen Pedersen MD    D: 07/10/2022 12:42:56       T: 07/10/2022 14:12:16     [id number]  Job#: [id number]     Doc#: [id number]    CC:  MD Elvin oLpez MD Philip Ra. Bramlee,

## 2022-07-10 NOTE — PROGRESS NOTES
Pt received into room 3122 per stretcher from th ED pt. Was maxislide from stretcher to bed , positioned for comfort has back brace on in bed. Pt. Is in excuriating pain with movement.

## 2022-07-10 NOTE — FLOWSHEET NOTE
Patient at 87% on RA. However since the goal is comfort care, the patient (and family) do not wish to be placed on a NC at this time. Patient and family member both educated on sp02 levels and that low dose 02 supplementation is part of standard comfort care practices.  Will reassess at 0300.        07/10/22 0000   Vital Signs   Temp 97.8 °F (36.6 °C)   Temp Source Oral   Heart Rate 100   Heart Rate Source Monitor   Resp 17   BP (!) 156/82   BP Location Left Arm   MAP (Calculated) 106.67   Level of Consciousness Alert (0)   MEWS Score 1   Oxygen Therapy   SpO2 (!) 87 %   O2 Device None (Room air)

## 2022-07-11 PROCEDURE — 94760 N-INVAS EAR/PLS OXIMETRY 1: CPT

## 2022-07-11 PROCEDURE — 6370000000 HC RX 637 (ALT 250 FOR IP): Performed by: INTERNAL MEDICINE

## 2022-07-11 PROCEDURE — 6360000002 HC RX W HCPCS: Performed by: INTERNAL MEDICINE

## 2022-07-11 PROCEDURE — 51702 INSERT TEMP BLADDER CATH: CPT

## 2022-07-11 PROCEDURE — 1200000000 HC SEMI PRIVATE

## 2022-07-11 PROCEDURE — 2580000003 HC RX 258: Performed by: INTERNAL MEDICINE

## 2022-07-11 RX ORDER — SENNA AND DOCUSATE SODIUM 50; 8.6 MG/1; MG/1
2 TABLET, FILM COATED ORAL 2 TIMES DAILY PRN
Status: DISCONTINUED | OUTPATIENT
Start: 2022-07-11 | End: 2022-07-14 | Stop reason: HOSPADM

## 2022-07-11 RX ADMIN — OXYCODONE 5 MG: 5 TABLET ORAL at 14:29

## 2022-07-11 RX ADMIN — DEXAMETHASONE SODIUM PHOSPHATE 4 MG: 4 INJECTION, SOLUTION INTRAMUSCULAR; INTRAVENOUS at 01:07

## 2022-07-11 RX ADMIN — DORZOLAMIDE HYDROCHLORIDE AND TIMOLOL MALEATE 1 DROP: 20; 5 SOLUTION/ DROPS OPHTHALMIC at 09:23

## 2022-07-11 RX ADMIN — HYDROMORPHONE HYDROCHLORIDE 1 MG: 1 INJECTION, SOLUTION INTRAMUSCULAR; INTRAVENOUS; SUBCUTANEOUS at 15:58

## 2022-07-11 RX ADMIN — LATANOPROST 1 DROP: 50 SOLUTION OPHTHALMIC at 01:08

## 2022-07-11 RX ADMIN — Medication 10 ML: at 09:17

## 2022-07-11 RX ADMIN — OXYCODONE HYDROCHLORIDE 20 MG: 10 TABLET, FILM COATED, EXTENDED RELEASE ORAL at 19:57

## 2022-07-11 RX ADMIN — OXYCODONE HYDROCHLORIDE 20 MG: 10 TABLET, FILM COATED, EXTENDED RELEASE ORAL at 09:16

## 2022-07-11 RX ADMIN — THERA TABS 1 TABLET: TAB at 09:17

## 2022-07-11 RX ADMIN — HYDROMORPHONE HYDROCHLORIDE 1 MG: 1 INJECTION, SOLUTION INTRAMUSCULAR; INTRAVENOUS; SUBCUTANEOUS at 21:05

## 2022-07-11 RX ADMIN — DEXAMETHASONE SODIUM PHOSPHATE 4 MG: 4 INJECTION, SOLUTION INTRAMUSCULAR; INTRAVENOUS at 12:22

## 2022-07-11 RX ADMIN — OXYCODONE 5 MG: 5 TABLET ORAL at 22:17

## 2022-07-11 RX ADMIN — METHOCARBAMOL 1000 MG: 500 TABLET ORAL at 14:29

## 2022-07-11 RX ADMIN — DEXAMETHASONE SODIUM PHOSPHATE 4 MG: 4 INJECTION, SOLUTION INTRAMUSCULAR; INTRAVENOUS at 06:46

## 2022-07-11 RX ADMIN — SENNOSIDES 8.6 MG: 8.6 TABLET, FILM COATED ORAL at 19:57

## 2022-07-11 RX ADMIN — DEXAMETHASONE SODIUM PHOSPHATE 4 MG: 4 INJECTION, SOLUTION INTRAMUSCULAR; INTRAVENOUS at 19:57

## 2022-07-11 RX ADMIN — PSYLLIUM HUSK 1 PACKET: 3.4 GRANULE ORAL at 09:15

## 2022-07-11 RX ADMIN — ENOXAPARIN SODIUM 40 MG: 100 INJECTION SUBCUTANEOUS at 09:16

## 2022-07-11 ASSESSMENT — PAIN DESCRIPTION - DESCRIPTORS
DESCRIPTORS: ACHING
DESCRIPTORS: ACHING;DISCOMFORT
DESCRIPTORS: ACHING
DESCRIPTORS: ACHING
DESCRIPTORS: ACHING;DISCOMFORT
DESCRIPTORS: ACHING;DISCOMFORT

## 2022-07-11 ASSESSMENT — PAIN DESCRIPTION - FREQUENCY
FREQUENCY: CONTINUOUS

## 2022-07-11 ASSESSMENT — PAIN SCALES - WONG BAKER
WONGBAKER_NUMERICALRESPONSE: 0
WONGBAKER_NUMERICALRESPONSE: 4
WONGBAKER_NUMERICALRESPONSE: 4

## 2022-07-11 ASSESSMENT — PAIN SCALES - GENERAL
PAINLEVEL_OUTOF10: 7
PAINLEVEL_OUTOF10: 7
PAINLEVEL_OUTOF10: 0
PAINLEVEL_OUTOF10: 0
PAINLEVEL_OUTOF10: 3
PAINLEVEL_OUTOF10: 7
PAINLEVEL_OUTOF10: 5
PAINLEVEL_OUTOF10: 0
PAINLEVEL_OUTOF10: 7

## 2022-07-11 ASSESSMENT — PAIN - FUNCTIONAL ASSESSMENT
PAIN_FUNCTIONAL_ASSESSMENT: PREVENTS OR INTERFERES SOME ACTIVE ACTIVITIES AND ADLS
PAIN_FUNCTIONAL_ASSESSMENT: ACTIVITIES ARE NOT PREVENTED
PAIN_FUNCTIONAL_ASSESSMENT: PREVENTS OR INTERFERES SOME ACTIVE ACTIVITIES AND ADLS

## 2022-07-11 ASSESSMENT — PAIN DESCRIPTION - PAIN TYPE
TYPE: ACUTE PAIN
TYPE: ACUTE PAIN
TYPE: ACUTE PAIN;CHRONIC PAIN

## 2022-07-11 ASSESSMENT — PAIN DESCRIPTION - ONSET
ONSET: ON-GOING

## 2022-07-11 ASSESSMENT — ENCOUNTER SYMPTOMS
GASTROINTESTINAL NEGATIVE: 1
RESPIRATORY NEGATIVE: 1
BACK PAIN: 1
EYES NEGATIVE: 1

## 2022-07-11 ASSESSMENT — PAIN DESCRIPTION - ORIENTATION
ORIENTATION: LOWER
ORIENTATION: LOWER

## 2022-07-11 ASSESSMENT — PAIN DESCRIPTION - LOCATION
LOCATION: GENERALIZED
LOCATION: BACK
LOCATION: GENERALIZED
LOCATION: GENERALIZED

## 2022-07-11 NOTE — PROGRESS NOTES
Hospitalist Progress Note      PCP: Patrick Walker DO    Date of Admission: 7/9/2022      Subjective: Back pain controlled, no dizziness lightheadedness chest pain or shortness of breath no abdominal pain at this time. Daughter at bedside      Medications:  Reviewed    Infusion Medications    sodium chloride       Scheduled Medications    oxyCODONE  20 mg Oral 2 times per day    dexamethasone  4 mg IntraVENous Q6H    lidocaine  1 patch TransDERmal Daily    psyllium  1 packet Oral Daily    multivitamin  1 tablet Oral Daily    latanoprost  1 drop Both Eyes Nightly    dorzolamide-timolol  1 drop Both Eyes BID    enoxaparin  40 mg SubCUTAneous Daily    senna  1 tablet Oral Nightly     PRN Meds: HYDROmorphone, oxyCODONE, sodium chloride flush, sodium chloride, potassium chloride **OR** potassium alternative oral replacement **OR** potassium chloride, magnesium sulfate, ondansetron **OR** ondansetron, acetaminophen **OR** acetaminophen, melatonin, calcium carbonate, methocarbamol      Intake/Output Summary (Last 24 hours) at 7/11/2022 0813  Last data filed at 7/11/2022 0425  Gross per 24 hour   Intake 160 ml   Output 850 ml   Net -690 ml       Physical Exam Performed:    BP (!) 142/75   Pulse 78   Temp 98.1 °F (36.7 °C) (Axillary)   Resp 13   Ht 5' 3\" (1.6 m)   Wt 135 lb 2.3 oz (61.3 kg)   SpO2 90%   BMI 23.94 kg/m²     General appearance: No apparent distress  Neck: Supple  Respiratory:  Normal respiratory effort. Clear to auscultation, bilaterally without Rales/Wheezes/Rhonchi. Cardiovascular: Regular rate and rhythm with normal S1/S2 without murmurs, rubs or gallops. Abdomen: Soft, non-tender, non-distended  Musculoskeletal: No clubbing, cyanosis  Skin: Skin color, texture, turgor normal.  No rashes or lesions. Neurologic:  No focal weakness.   Psychiatric: Alert and oriented  Capillary Refill: Brisk,3 seconds, normal   Peripheral Pulses: +2 palpable, equal bilaterally       Labs:   Recent Labs     07/09/22  1115   WBC 8.8   HGB 12.7   HCT 37.9   PLT 86*     Recent Labs     07/09/22  1115      K 4.3      CO2 23   BUN 29*   CREATININE 1.0   CALCIUM 8.6     No results for input(s): AST, ALT, BILIDIR, BILITOT, ALKPHOS in the last 72 hours. No results for input(s): INR in the last 72 hours. No results for input(s): Magdalene Rosenberg in the last 72 hours. Urinalysis:      Lab Results   Component Value Date/Time    NITRU Negative 07/10/2022 07:05 PM    WBCUA 3 07/10/2022 07:05 PM    BACTERIA None Seen 07/10/2022 07:05 PM    RBCUA 2 07/10/2022 07:05 PM    BLOODU Negative 07/10/2022 07:05 PM    SPECGRAV 1.027 07/10/2022 07:05 PM    GLUCOSEU Negative 07/10/2022 07:05 PM       Radiology:  CT THORACIC SPINE WO CONTRAST   Final Result   1. Findings of diffuse osseous metastatic disease are new/progressed from   the prior chest CT done April 19, 2022.      2.  Subacute T10 inferior endplate compression fracture with 25% height loss   may represent a pathologic fracture. No significant posterior fracture   retropulsion. 3.  Minimal T12 superior endplate height loss with subtle cortical defect   could represent an acute/subacute compression fracture. 4.  Nondisplaced fracture of the T10 spinous process. 5.  Subtle rounded 1.8 cm area of hypoattenuation in the hepatic dome raises   concern for metastatic disease and could be further evaluated with CT abdomen   pelvis. RECOMMENDATIONS:   Follow-up MRI may be helpful for further evaluation. CT LUMBAR SPINE WO CONTRAST   Final Result   1. Findings of diffuse osseous metastatic disease are new/progressed   compared to CT abdomen pelvis done April 19, 2022.      2.  No acute fracture in the lumbar spine. 3.  Multilevel degenerative changes in the lumbar spine. 4.  Grade 1 anterolisthesis of L5 on S1 secondary to chronic bilateral L5   pars interarticularis defects.                  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 Metastatic stage IV breast cancer, oncology consulted, apparently patient was not tolerating chemotherapy, plan for hospice care. Pain management. 2.  Intractable Back pain with multiple vertebral lesions along with vertebral fracture, neurosurgery consulted, brace placed. Pain management. Apparently patient and family declined kyphoplasty radiation therapy for steroids started per oncology  3. Dysphagia, comfort measures for now  4. Generalized weakness, due to above  5. Constipation adding stimulant. Diet: ADULT DIET;  Regular  Code Status: DNR-CC      Steven Hoffmanr, MD

## 2022-07-11 NOTE — PROGRESS NOTES
Comprehensive Nutrition Assessment    Type and Reason for Visit:  Initial    Nutrition Recommendations/Plan:   1. Continue regular diet  2. Boost pudding BID     Malnutrition Assessment:  Malnutrition Status: At risk for malnutrition (Comment) (07/11/22 1221)    Context:  Acute Illness     Findings of the 6 clinical characteristics of malnutrition:  Energy Intake:  Mild decrease in energy intake (Comment) (minimal for 3 days)  Weight Loss:  No significant weight loss     Body Fat Loss:  No significant body fat loss     Muscle Mass Loss:  No significant muscle mass loss    Fluid Accumulation:  No significant fluid accumulation     Strength:  Not Performed    Nutrition Assessment:    MST. Pt admitted for back pain related to stg 4 breast cancer. Hx of diverticulitus, cancer. Pt family stated uknown amount of wt loss but wt is trending down. Pt stated adequate intake PTA. Pt currently on solid diet, family denied SLP eval. Poor current PO intake 0 - 25%. Pt declined Ensure ONS. Will trial Boost pudding. Nutrition Related Findings:    LBM 7/0 Wound Type: None       Current Nutrition Intake & Therapies:    Average Meal Intake: 0%,1-25%  Average Supplements Intake: None Ordered  ADULT DIET; Regular    Anthropometric Measures:  Height: 5' 3\" (160 cm)  Ideal Body Weight (IBW): 115 lbs (52 kg)    Admission Body Weight: 142 lb (64.4 kg)  Current Body Weight: 135 lb (61.2 kg), 117.4 % IBW. Weight Source: Bed Scale  Current BMI (kg/m2): 23.9        Weight Adjustment For: No Adjustment                 BMI Categories: Normal Weight (BMI 18.5-24. 9)    Estimated Daily Nutrient Needs:  Energy Requirements Based On: Kcal/kg  Weight Used for Energy Requirements: Current  Energy (kcal/day): 1525 - 1830 kcals/day (25 - 30 kcal/kg ABW)  Weight Used for Protein Requirements: Current  Protein (g/day): 61 - 92 kcals/day (1 - 1.5 g/kg ABW)  Method Used for Fluid Requirements: 1 ml/kcal  Fluid (ml/day):      Nutrition Diagnosis: · Inadequate oral intake related to catabolic illness as evidenced by intake 0-25%      Nutrition Interventions:   Food and/or Nutrient Delivery: Continue Current Diet,Start Oral Nutrition Supplement  Nutrition Education/Counseling: No recommendation at this time  Coordination of Nutrition Care: Continue to monitor while inpatient       Goals:     Goals: Meet at least 75% of estimated needs       Nutrition Monitoring and Evaluation:   Behavioral-Environmental Outcomes: None Identified  Food/Nutrient Intake Outcomes: Food and Nutrient Intake,Supplement Intake  Physical Signs/Symptoms Outcomes: Biochemical Data,Nutrition Focused Physical Findings,Skin,Weight    Discharge Planning:    No discharge needs at this time     [address]: [address] Nar[address]

## 2022-07-11 NOTE — PLAN OF CARE
Problem: Discharge Planning  Goal: Discharge to home or other facility with appropriate resources  Outcome: Progressing     Problem: Pain  Goal: Verbalizes/displays adequate comfort level or baseline comfort level  Outcome: Progressing  Patient's pain will be controlled with pharmacological and non-pharmacological interventions. Nursing will continue to monitor.       Problem: Skin/Tissue Integrity  Goal: Absence of new skin breakdown  Outcome: Progressing     Problem: Safety - Adult  Goal: Free from fall injury  Outcome: Progressing     Problem: ABCDS Injury Assessment  Goal: Absence of physical injury  Outcome: Progressing

## 2022-07-11 NOTE — PROGRESS NOTES
[facility]  Oncology Hematology Care  Progress Note      SUBJECTIVE:   Pt restin states pain is controlled she is not eating/drinkiing   n  OBJECTIVE      Medications    Current Facility-Administered Medications: sennosides-docusate sodium (SENOKOT-S) 8.6-50 MG tablet 2 tablet, 2 tablet, Oral, BID PRN  HYDROmorphone (DILAUDID) injection 1 mg, 1 mg, IntraVENous, Q3H PRN  oxyCODONE (OXYCONTIN) extended release tablet 20 mg, 20 mg, Oral, 2 times per day  dexamethasone (DECADRON) injection 4 mg, 4 mg, IntraVENous, Q6H  lidocaine 4 % external patch 1 patch, 1 patch, TransDERmal, Daily  oxyCODONE (ROXICODONE) immediate release tablet 5 mg, 5 mg, Oral, Q4H PRN  psyllium (KONSYL) 28.3 % packet 1 packet, 1 packet, Oral, Daily  multivitamin 1 tablet, 1 tablet, Oral, Daily  latanoprost (XALATAN) 0.005 % ophthalmic solution 1 drop, 1 drop, Both Eyes, Nightly  dorzolamide-timolol (COSOPT) 22.3-6.8 MG/ML ophthalmic solution 1 drop, 1 drop, Both Eyes, BID  sodium chloride flush 0.9 % injection 10 mL, 10 mL, IntraVENous, PRN  0.9 % sodium chloride infusion, , IntraVENous, PRN  potassium chloride (KLOR-CON M) extended release tablet 40 mEq, 40 mEq, Oral, PRN **OR** potassium bicarb-citric acid (EFFER-K) effervescent tablet 40 mEq, 40 mEq, Oral, PRN **OR** potassium chloride 10 mEq/100 mL IVPB (Peripheral Line), 10 mEq, IntraVENous, PRN  magnesium sulfate 1000 mg in dextrose 5% 100 mL IVPB, 1,000 mg, IntraVENous, PRN  enoxaparin (LOVENOX) injection 40 mg, 40 mg, SubCUTAneous, Daily  ondansetron (ZOFRAN-ODT) disintegrating tablet 4 mg, 4 mg, Oral, Q8H PRN **OR** ondansetron (ZOFRAN) injection 4 mg, 4 mg, IntraVENous, Q6H PRN  acetaminophen (TYLENOL) tablet 650 mg, 650 mg, Oral, Q6H PRN **OR** acetaminophen (TYLENOL) suppository 650 mg, 650 mg, Rectal, Q6H PRN  melatonin tablet 3 mg, 3 mg, Oral, Nightly PRN  calcium carbonate (TUMS) chewable tablet 1,000 mg, 1,000 mg, Oral, TID PRN  senna (SENOKOT) tablet 8.6 mg, 1 tablet, Oral, Nightly  methocarbamol (ROBAXIN) tablet 1,000 mg, 1,000 mg, Oral, 4x Daily PRN  Physical    VITALS:  BP (!) 142/75   Pulse 78   Temp 98.1 °F (36.7 °C) (Axillary)   Resp 20   Ht 5' 3\" (1.6 m)   Wt 135 lb 2.3 oz (61.3 kg)   SpO2 90%   BMI 23.94 kg/m²   TEMPERATURE:  Current - Temp: 98.1 °F (36.7 °C); Max - Temp  Av °F (36.7 °C)  Min: 97.4 °F (36.3 °C)  Max: 98.5 °F (36.9 °C)  PULSE OXIMETRY RANGE: SpO2  Av.8 %  Min: 86 %  Max: 93 %  24HR INTAKE/OUTPUT:    Intake/Output Summary (Last 24 hours) at 2022 1007  Last data filed at 2022 0846  Gross per 24 hour   Intake 400 ml   Output 850 ml   Net -450 ml       CONSTITUTIONAL:resting   HEMATOLOGIC/LYMPHATICS:  no cervical lymphadenopathy, no supraclavicular lymphadenopathy, CARDIOVASCULAR:  , regular rate and rhythm, normal S1 and S2, no S3 or S4, and no murmur noted  ABDOMEN:  No scars, normal bowel sounds, soft, non-distended, non-tender, no masses palpated, no hepatosplenomegally  MUSCULOSKELETAL:  There is no redness, warmth, or swelling of the joints. EXTREMETIES: No clubbing cynosis or edema        Data      Recent Labs     22  1115   WBC 8.8   HGB 12.7   HCT 37.9   PLT 86*   MCV 80.6        Recent Labs     22  1115      K 4.3      CO2 23   BUN 29*   CREATININE 1.0     No results for input(s): AST, ALT, ALB, BILIDIR, BILITOT, ALKPHOS in the last 72 hours. Magnesium:  No results found for: MG    Imaging XR CHEST (2 VW)    Result Date: 2022  EXAMINATION: TWO XRAY VIEWS OF THE CHEST 2022 3:23 pm COMPARISON: 2022 HISTORY: ORDERING SYSTEM PROVIDED HISTORY: Dyspnea and respiratory abnormality TECHNOLOGIST PROVIDED HISTORY: Reason for Exam: Dyspnea and respiratory abnormality FINDINGS: Heart size is normal.  Mediastinal contours are normal.  Pulmonary vascularity is normal. A few bandlike opacities are seen at the lung bases, likely subsegmental atelectasis. No focal lung consolidation is noted. Left-sided MediPort is seen. Tip projects at the level of the cavoatrial junction. .  Atherosclerotic change is seen in aorta. Bandlike opacities are seen at the lung bases, likely subsegmental atelectasis or scar. No pneumonia or edema     CT THORACIC SPINE WO CONTRAST    Result Date: 7/9/2022  EXAMINATION: CT OF THE THORACIC SPINE WITHOUT CONTRAST  7/9/2022 11:10 am: TECHNIQUE: CT of the thoracic spine was performed without the administration of intravenous contrast. Multiplanar reformatted images are provided for review. Automated exposure control, iterative reconstruction, and/or weight based adjustment of the mA/kV was utilized to reduce the radiation dose to as low as reasonably achievable. COMPARISON: Chest CT done 04/19/2022. chest radiographs done June 22, 2022. HISTORY: ORDERING SYSTEM PROVIDED HISTORY: Low back pain, NKI, being tx for breast CA TECHNOLOGIST PROVIDED HISTORY: Reason for exam:->Low back pain, NKI, being tx for breast CA Reason for Exam: Low back pain, NKI, being tx for breast CA FINDINGS: BONES/ALIGNMENT: Subacute T10 inferior endplate compression fracture with 25% height loss and osseous sclerosis is similar compared to chest radiographs done June 22, 2022. No significant posterior fracture retropulsion. Nondisplaced fracture of the T10 spinous process. Minimal T12 superior endplate height loss with subtle cortical defect. The other thoracic vertebral body heights are maintained. No at the evidence of fracture in the thoracic spine. Findings of diffuse osseous metastatic disease with numerous scattered areas of osseous lucency and sclerosis throughout. Findings are new/progressed from the prior chest CT. DEGENERATIVE CHANGES: Multilevel degenerative disc and facet joint disease. No gross spinal canal stenosis or bony neural foraminal narrowing of the thoracic spine. SOFT TISSUES: No paraspinal mass is seen. Atherosclerosis. Coronary artery calcifications.   Bilateral peripelvic renal cysts versus pelvicaliectasis. Subtle rounded 1.8 cm area of hypoattenuation in the hepatic dome. Left IJ port with tip near the superior atrial caval junction. Trace right greater than left pleural fluid. 1.  Findings of diffuse osseous metastatic disease are new/progressed from the prior chest CT done April 19, 2022. 2.  Subacute T10 inferior endplate compression fracture with 25% height loss may represent a pathologic fracture. No significant posterior fracture retropulsion. 3.  Minimal T12 superior endplate height loss with subtle cortical defect could represent an acute/subacute compression fracture. 4.  Nondisplaced fracture of the T10 spinous process. 5.  Subtle rounded 1.8 cm area of hypoattenuation in the hepatic dome raises concern for metastatic disease and could be further evaluated with CT abdomen pelvis. RECOMMENDATIONS: Follow-up MRI may be helpful for further evaluation. CT LUMBAR SPINE WO CONTRAST    Result Date: 7/9/2022  EXAMINATION: CT OF THE LUMBAR SPINE WITHOUT CONTRAST  7/9/2022 TECHNIQUE: CT of the lumbar spine was performed without the administration of intravenous contrast. Multiplanar reformatted images are provided for review. Adjustment of mA and/or kV according to patient size was utilized. Automated exposure control, iterative reconstruction, and/or weight based adjustment of the mA/kV was utilized to reduce the radiation dose to as low as reasonably achievable. COMPARISON: CT abdomen pelvis done April 19, 2022.  HISTORY: ORDERING SYSTEM PROVIDED HISTORY: Low back pain, NKI, being tx for breast CA TECHNOLOGIST PROVIDED HISTORY: Reason for exam:->Low back pain, NKI, being tx for breast CA Decision Support Exception - unselect if not a suspected or confirmed emergency medical condition->Emergency Medical Condition (MA) Reason for Exam: Low back pain, NKI, being tx for breast CA FINDINGS: BONES/ALIGNMENT: Findings of diffuse osseous metastatic disease with numerous sclerotic/lucent lesions throughout the visualized osseous structures. For example, there is a rounded mixed sclerotic and lucent lesion in the right L2 vertebral body which measures 2.3 cm. No acute fracture. Grade 1 anterolisthesis of L5 on S1 measures 8 mm. Chronic bilateral L5 pars interarticularis defects. Grade 1 degenerative anterolisthesis of L4 on L5 measures 3 mm. No significant listhesis at the other levels. DEGENERATIVE CHANGES: Multilevel degenerative disc and facet joint disease. Multilevel disc bulges. Mild-to-moderate L3-L4 and L4-L5 spinal canal stenosis secondary to disc bulges, facet hypertrophy and ligamentum flavum thickening multilevel neural foraminal narrowing is most pronounced and severe bilaterally at L5-S1. SOFT TISSUES/RETROPERITONEUM: No paraspinal mass is seen. Atherosclerosis. 1.  Findings of diffuse osseous metastatic disease are new/progressed compared to CT abdomen pelvis done April 19, 2022. 2.  No acute fracture in the lumbar spine. 3.  Multilevel degenerative changes in the lumbar spine. 4.  Grade 1 anterolisthesis of L5 on S1 secondary to chronic bilateral L5 pars interarticularis defects.        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    Cancer associated pain    Breast cancer, stage 4, right (HCC)       ASSESSMENT AND PLAN    Stage Iv breast ca  Upfront it was not felt she was stage iv based on scan reports   Daughter is upset about this -which is understandable0-she is worried the scans probably held mets from the get go -  When I met the pt she had no severe back pain etc thus her ct cap and bone scan reads did not lead me tot hink I needed a pet   I did explain her treatment would have been the same -but pts gina stated she probably would not have done chemo if had known it was stage iv to begin with   I highly apologized for this but did state I did not feel misled by the reads upfront  They do want hospice care-will await their choice     Ekta Winter MD, MD

## 2022-07-11 NOTE — PROGRESS NOTES
Pt currently in bed resting with family at bedside. Received a bed bath with linen changed, clothes changed, catheter care and central line care. Pt called out in pain and was medicated with dilaudid, after being premedicated with oxycodone prior to bath. No acute needs at this time. Fall precautions in place. Sheth patent and draining clear yellow urine. Family encouraged to call out with any needs.

## 2022-07-11 NOTE — CARE COORDINATION
Received call from Violet Jacobs at [facility] ([phone]).  She plans to meet with patient today at 3pm.     Electronically signed by JOHN Contreras, FIDEL, Case Management on 7/11/2022 at 2:34 PM  [facility] [id number]

## 2022-07-11 NOTE — PROGRESS NOTES
Patient resting and sleeping at intervals. Daughter at bedside. Siderails up x3. Call light in reach. Fall precautions in place.

## 2022-07-11 NOTE — PLAN OF CARE
Problem: Discharge Planning  Goal: Discharge to home or other facility with appropriate resources  7/11/2022 0924 by Shiloh Lopez RN  Outcome: Progressing  Flowsheets (Taken 7/10/2022 0953 by Juanita Hayes, RN)  Discharge to home or other facility with appropriate resources: Identify barriers to discharge with patient and caregiver  7/11/2022 0323 by Haylee Veliz RN  Outcome: Progressing     Problem: Pain  Goal: Verbalizes/displays adequate comfort level or baseline comfort level  7/11/2022 0924 by Shiloh Lopez RN  Outcome: Progressing  [address] (Taken 7/10/2022 0951 by Juanita Hayes, RN)  Verbalizes/displays adequate comfort level or baseline comfort level: Encourage patient to monitor pain and request assistance  7/11/2022 0323 by Haylee Veliz RN  Outcome: Progressing     Problem: Skin/Tissue Integrity  Goal: Absence of new skin breakdown  7/11/2022 0924 by Shiloh Lopez RN  Outcome: Progressing  Note: No new skin breakdown noted  7/11/2022 0323 by Haylee Veliz RN  Outcome: Progressing     Problem: Safety - Adult  Goal: Free from fall injury  7/11/2022 0924 by Shiloh Lopez RN  Outcome: Evangelina Santos (Taken 7/10/2022 1202 by Juanita Hayes, RN)  Free From Fall Injury: Instruct family/caregiver on patient safety  7/11/2022 0323 by Haylee Veliz RN  Outcome: Progressing     Problem: ABCDS Injury Assessment  Goal: Absence of physical injury  7/11/2022 0924 by Shiloh Lopez RN  Outcome: Progressing  Flowsheets (Taken 7/10/2022 1202 by Juanita Hayes, RN)  Absence of Physical Injury: Implement safety measures based on patient assessment  7/11/2022 0323 by Haylee Veliz RN  Outcome: Progressing

## 2022-07-11 NOTE — CONSULTS
PALLIATIVE MEDICINE CONSULTATION     Patient name:Alee Franco   WCX:[id number]    :1933  Room/Bed:T8C-2640/3122-01   LOS: 2 days         Date of consult:2022    Consult Information    Inpatient consult to Palliative Care  Consult performed by: MARGARITO [place] Mt - CNP  Consult ordered by: Romana Just., MD         Reason for Consult: Goals of Care, Code Status. ASSESSMENT/RECOMMENDATIONS     80 y.o. female with debility and back pain. Symptom Management:  Debility: Patient weak and lethargic while lying in the bed today. Patient was also pale. Patient is requiring assistance with her ADL's. Back Pain: Patient taking Oxycodone 20 mg scheduled BID. Goals of Care: Met the patient and her daughters Deneice Lines and Vero) at the bedside. Patient was lethargic but oriented x 4. Reviewed patient's current health status, goals of care and code status. Patient and her family wish to pursue hospice services at this time (hospice consult has been placed by Dr. Qamar Romero). Code status reviewed and is to remain DNR-CC. Patient/Family Goals of Care :     - Met the patient and her daughters Deneice Lines and Vero) at the bedside. Patient was lethargic but oriented x 4. Reviewed patient's current health status, goals of care and code status. Patient and her family wish to pursue hospice services at this time (hospice consult has been placed by Dr. Qamar Romero). Code status reviewed and is to remain DNR-CC. Disposition/Discharge Plan:   Hospice consult placed by Dr. Qamar Romero. Advance Directives:  Surrogate Decision Maker: Patient has 3 children (Marlon Huang and Roma Turpin). No POA paperwork completed per family. Code status:  DNR-CC    Case discussed with: patient, floor RN, Bernabe Flor (daughter), Kee Zhang (daughter)  Thank you for allowing us to participate in the care of this patient. HISTORY     CC: Debility.   HPI: The patient is a 80 y.o. female with a past medical history of breast cancer, glaucoma, diverticulitis and macular degeneration who presented to [facility] with back pain. Patient was admitted with malignant breast neoplasm metastatic to bone, compression fracture of vertebra (T10 and T12) and lesion of liver. Palliative Medicine SymptomScreening/ROS:    Review of Systems   Constitutional: Positive for fatigue. HENT: Negative. Eyes: Negative. Respiratory: Negative. Cardiovascular: Positive for leg swelling. Gastrointestinal: Negative. Musculoskeletal: Positive for back pain. Skin: Positive for pallor. Neurological: Positive for weakness. Psychiatric/Behavioral: Negative. All other systems reviewed and are negative. A complete 10 count ROS was obtained. Pertinent positives mentioned above in HPI/ROS. All others if not mentioned are negative. Palliative Performance Scale:     [] 60%  Amb reduced; Sig dz. Can't do hobbies/housework; Intake normal or reduced, Occasional assist; LOC full/confusion   [] 50%  Mainly sit/lie; Extensive disease. Mainly assist, Intake normal or reduced; Occasional assist; LOC full/confusion   [x] 40%  Mainly in bed; Extensive disease; Mainly assist; Intake normal or reduced; Occasional assist; LOC full/confusion   [] 30%  Bed bound, Extensive disease; Total care; Intake reduced; LOC full/confusion   [] 20%  Bed bound; Extensive disease; Total care; Intake minimal; Drowsy/coma   [] 10%  Bed bound; Extensive disease; Total care; Mouth care only; Drowsy/coma   []  0%   Death     Home med list and hospital medications reviewed in chart as of 7/11/2022     EXAM     Vitals:    07/11/22 1558   BP:    Pulse:    Resp: 18   Temp:    SpO2:        Physical Exam  Vitals reviewed. Constitutional:       Appearance: She is ill-appearing. HENT:      Head: Normocephalic and atraumatic. Nose: Nose normal.   Eyes:      Extraocular Movements: Extraocular movements intact.       Pupils: Pupils are equal, round,

## 2022-07-12 PROCEDURE — 6370000000 HC RX 637 (ALT 250 FOR IP): Performed by: INTERNAL MEDICINE

## 2022-07-12 PROCEDURE — 6360000002 HC RX W HCPCS: Performed by: INTERNAL MEDICINE

## 2022-07-12 PROCEDURE — 1200000000 HC SEMI PRIVATE

## 2022-07-12 PROCEDURE — 2700000000 HC OXYGEN THERAPY PER DAY

## 2022-07-12 PROCEDURE — 94760 N-INVAS EAR/PLS OXIMETRY 1: CPT

## 2022-07-12 RX ORDER — LORAZEPAM 2 MG/ML
0.5 INJECTION INTRAMUSCULAR ONCE
Status: COMPLETED | OUTPATIENT
Start: 2022-07-12 | End: 2022-07-12

## 2022-07-12 RX ORDER — LORAZEPAM 0.5 MG/1
0.25 TABLET ORAL EVERY 6 HOURS PRN
Status: ACTIVE | OUTPATIENT
Start: 2022-07-12 | End: 2022-07-13

## 2022-07-12 RX ADMIN — SENNOSIDES AND DOCUSATE SODIUM 2 TABLET: 50; 8.6 TABLET ORAL at 09:22

## 2022-07-12 RX ADMIN — DORZOLAMIDE HYDROCHLORIDE AND TIMOLOL MALEATE 1 DROP: 20; 5 SOLUTION/ DROPS OPHTHALMIC at 09:15

## 2022-07-12 RX ADMIN — THERA TABS 1 TABLET: TAB at 09:17

## 2022-07-12 RX ADMIN — DEXAMETHASONE SODIUM PHOSPHATE 4 MG: 4 INJECTION, SOLUTION INTRAMUSCULAR; INTRAVENOUS at 06:10

## 2022-07-12 RX ADMIN — HYDROMORPHONE HYDROCHLORIDE 1 MG: 1 INJECTION, SOLUTION INTRAMUSCULAR; INTRAVENOUS; SUBCUTANEOUS at 13:44

## 2022-07-12 RX ADMIN — PSYLLIUM HUSK 1 PACKET: 3.4 GRANULE ORAL at 09:17

## 2022-07-12 RX ADMIN — Medication 0.5 MG: at 14:20

## 2022-07-12 RX ADMIN — HYDROMORPHONE HYDROCHLORIDE 1 MG: 1 INJECTION, SOLUTION INTRAMUSCULAR; INTRAVENOUS; SUBCUTANEOUS at 02:49

## 2022-07-12 RX ADMIN — ENOXAPARIN SODIUM 40 MG: 100 INJECTION SUBCUTANEOUS at 09:18

## 2022-07-12 RX ADMIN — HYDROMORPHONE HYDROCHLORIDE 1 MG: 1 INJECTION, SOLUTION INTRAMUSCULAR; INTRAVENOUS; SUBCUTANEOUS at 16:59

## 2022-07-12 RX ADMIN — DEXAMETHASONE SODIUM PHOSPHATE 4 MG: 4 INJECTION, SOLUTION INTRAMUSCULAR; INTRAVENOUS at 13:44

## 2022-07-12 RX ADMIN — HYDROMORPHONE HYDROCHLORIDE 1 MG: 1 INJECTION, SOLUTION INTRAMUSCULAR; INTRAVENOUS; SUBCUTANEOUS at 23:05

## 2022-07-12 RX ADMIN — DEXAMETHASONE SODIUM PHOSPHATE 4 MG: 4 INJECTION, SOLUTION INTRAMUSCULAR; INTRAVENOUS at 00:05

## 2022-07-12 RX ADMIN — HYDROMORPHONE HYDROCHLORIDE 1 MG: 1 INJECTION, SOLUTION INTRAMUSCULAR; INTRAVENOUS; SUBCUTANEOUS at 06:10

## 2022-07-12 RX ADMIN — HYDROMORPHONE HYDROCHLORIDE 1 MG: 1 INJECTION, SOLUTION INTRAMUSCULAR; INTRAVENOUS; SUBCUTANEOUS at 10:24

## 2022-07-12 RX ADMIN — DEXAMETHASONE SODIUM PHOSPHATE 4 MG: 4 INJECTION, SOLUTION INTRAMUSCULAR; INTRAVENOUS at 18:34

## 2022-07-12 RX ADMIN — OXYCODONE HYDROCHLORIDE 20 MG: 10 TABLET, FILM COATED, EXTENDED RELEASE ORAL at 09:17

## 2022-07-12 RX ADMIN — HYDROMORPHONE HYDROCHLORIDE 1 MG: 1 INJECTION, SOLUTION INTRAMUSCULAR; INTRAVENOUS; SUBCUTANEOUS at 20:14

## 2022-07-12 ASSESSMENT — PAIN DESCRIPTION - ORIENTATION
ORIENTATION: LOWER

## 2022-07-12 ASSESSMENT — PAIN SCALES - WONG BAKER
WONGBAKER_NUMERICALRESPONSE: 0
WONGBAKER_NUMERICALRESPONSE: 4
WONGBAKER_NUMERICALRESPONSE: 4
WONGBAKER_NUMERICALRESPONSE: 0

## 2022-07-12 ASSESSMENT — PAIN DESCRIPTION - PAIN TYPE
TYPE: ACUTE PAIN;CHRONIC PAIN
TYPE: ACUTE PAIN;CHRONIC PAIN
TYPE: CHRONIC PAIN
TYPE: ACUTE PAIN;CHRONIC PAIN

## 2022-07-12 ASSESSMENT — PAIN DESCRIPTION - DESCRIPTORS
DESCRIPTORS: ACHING;DISCOMFORT

## 2022-07-12 ASSESSMENT — PAIN SCALES - GENERAL
PAINLEVEL_OUTOF10: 0
PAINLEVEL_OUTOF10: 7
PAINLEVEL_OUTOF10: 0
PAINLEVEL_OUTOF10: 2
PAINLEVEL_OUTOF10: 7
PAINLEVEL_OUTOF10: 7
PAINLEVEL_OUTOF10: 0
PAINLEVEL_OUTOF10: 0

## 2022-07-12 ASSESSMENT — PAIN DESCRIPTION - ONSET
ONSET: ON-GOING

## 2022-07-12 ASSESSMENT — PAIN DESCRIPTION - LOCATION
LOCATION: GENERALIZED
LOCATION: BACK

## 2022-07-12 ASSESSMENT — PAIN DESCRIPTION - FREQUENCY
FREQUENCY: CONTINUOUS

## 2022-07-12 NOTE — PROGRESS NOTES
Patient awake and alert. Daughter at bedside. Pain tolerable at 2/10. Scheduled Oxycodone given for pain. Morning medications given one at a time with sips of water. Sheth in place. VSS. Breakfast tray at bedside. Family requesting that pain medication be given when due to keep pain under control. Wants to wait until Oxycodone kicks in before turning patient to place Lidocaine patch. Will give Dilaudid at that time as well. Patient states feeling comfortable in currently position. Will continue to monitor.      Electronically signed by Sima Ferguson RN on 7/12/2022 at 12:39 PM

## 2022-07-12 NOTE — PROGRESS NOTES
Patient resting in bed with eyes closed at this time, appears comfortable, resp. Rate 14, mcdowell-baker FACES pain scale a 0 at this time, will continue to monitor.

## 2022-07-12 NOTE — PROGRESS NOTES
Slight coughing noted when giving morning PO medications. Daughter stated this has been happening more frequently when eating and drinking. HOB elevated. Patient instructed to tuck chin when swallowing. Mentioned swallow eval to daughter. Daughter declined due to patient going to hospice. Dr. Esteban Donnelly made aware. Will continue to monitor.      Electronically signed by Carmen Means RN on 7/12/2022 at 1:38 PM

## 2022-07-12 NOTE — PROGRESS NOTES
1.5 mg of Ativan wasted per hospital policy with Ag Correia RN.      Electronically signed by Su Valladares RN on 7/12/2022 at 6:06 PM     Electronically signed by Brad Russ RN on 7/12/2022 at 6:27 PM

## 2022-07-12 NOTE — CARE COORDINATION
Received message from Joseph Morataya at [address] [place] [address]--they plan to meet with family at 3pm today. She requested H&P and med list be faxed--sent patient information through epic.     Electronically signed by JOHN Delatorre, HELENW, Case Management on 7/12/2022 at 10:03 AM  [facility]

## 2022-07-12 NOTE — PLAN OF CARE
Problem: Discharge Planning  Goal: Discharge to home or other facility with appropriate resources  7/12/2022 1234 by Dot Snellen, RN  Outcome: Progressing  Note: Assessed patients knowledge of discharge. Will continue to work with patient on discharge planning and answer patient questions. Will consult case management and  as necessary. Electronically signed by Dot Snellen, RN on 7/12/2022 at 12:34 PM      Problem: Pain  Goal: Verbalizes/displays adequate comfort level or baseline comfort level  7/12/2022 1234 by Dot Snellen, RN  Outcome: Progressing  Note: Educated patient on pain management. Will assess patients pain level per unit protocol, and provide pain management measures as needed. Electronically signed by Dot Snellen, RN on 7/12/2022 at 12:34 PM      Problem: Skin/Tissue Integrity  Goal: Absence of new skin breakdown  Description: 1. Monitor for areas of redness and/or skin breakdown  2. Assess vascular access sites hourly  3. Every 4-6 hours minimum:  Change oxygen saturation probe site  4. Every 4-6 hours:  If on nasal continuous positive airway pressure, respiratory therapy assess nares and determine need for appliance change or resting period. 7/12/2022 1234 by Dot Snellen, RN  Outcome: Progressing  Note: Will monitor skin and mucous membranes. Will turn patient every 2 hours, monitor for friction and sheering, and change dressings as needed. Will preform skin assessment every shift. Electronically signed by Dot Snellen, RN on 7/12/2022 at 12:35 PM      Problem: Safety - Adult  Goal: Free from fall injury  7/12/2022 1234 by Dot Snellen, RN  Outcome: Progressing  Note: Bed in lowest position, wheels locked, bed/chair exit alarm in place, call light within reach, and non skid footwear on. Walkway free of clutter. Pt alert and oriented and able to make needs known.  Pt educated to use call light when needing to get up, and pt utilizes call light to make

## 2022-07-12 NOTE — PROGRESS NOTES
Patients son at bedside upon shift assessment, expressing to this RN that family would like patient to get pain medication when she is due. Educated son that pain medications are ordered as needed, states again they would like to stay in front of her pain and receive medications when she can. Educated son that if these medications are given when she can have them, this may decrease her blood pressure, heart rate, resp rate, and O2 saturation. Son states this is okay and does not want his mother to \"pass away crying in pain. \"     Upon assessment, patient is resting in bed with eyes closed, intermittently moaning in pain. Does open her eyes when spoken to and took PO medications without any issues. Vital signs are stable at this time, patient is currently on 4L nasal cannula, will continue to monitor.

## 2022-07-12 NOTE — CARE COORDINATION
Received update from Leonora Castro from [facility] of [address] [place] [address]. She reported family plan to discuss options this evening. Adelfo Ramirez from [address] [address] followed up with daughter Leticia Guzman present at hospital. She reported the family plan to meet this evening and hope to make a decision regarding plan.      Electronically signed by JOHN Jackson, FIDEL, Case Management on 7/12/2022 at 4:03 PM  [place] [id number]

## 2022-07-12 NOTE — PROGRESS NOTES
Hospitalist Progress Note      PCP: Angella Mendes DO    Date of Admission: 7/9/2022    Subjective: Feels okay, lower back pain better controlled. No nausea or vomiting no weakness in her lower extremity daughter at bedside and supportive. Medications:  Reviewed    Infusion Medications    sodium chloride       Scheduled Medications    oxyCODONE  20 mg Oral 2 times per day    dexamethasone  4 mg IntraVENous Q6H    lidocaine  1 patch TransDERmal Daily    psyllium  1 packet Oral Daily    multivitamin  1 tablet Oral Daily    latanoprost  1 drop Both Eyes Nightly    dorzolamide-timolol  1 drop Both Eyes BID    enoxaparin  40 mg SubCUTAneous Daily    senna  1 tablet Oral Nightly     PRN Meds: sennosides-docusate sodium, HYDROmorphone, oxyCODONE, sodium chloride flush, sodium chloride, potassium chloride **OR** potassium alternative oral replacement **OR** potassium chloride, magnesium sulfate, ondansetron **OR** ondansetron, acetaminophen **OR** acetaminophen, melatonin, calcium carbonate, methocarbamol      Intake/Output Summary (Last 24 hours) at 7/12/2022 1107  Last data filed at 7/12/2022 0601  Gross per 24 hour   Intake 290 ml   Output 400 ml   Net -110 ml       Physical Exam Performed:    BP (!) 145/80   Pulse 86   Temp 98.2 °F (36.8 °C) (Axillary)   Resp 12   Ht 5' 3\" (1.6 m)   Wt 133 lb 13.1 oz (60.7 kg)   SpO2 93%   BMI 23.71 kg/m²     General appearance: No apparent distress  Neck: Supple  Respiratory:  Normal respiratory effort. Clear to auscultation, bilaterally without Rales/Wheezes/Rhonchi. Cardiovascular: Regular rate and rhythm with normal S1/S2 without murmurs, rubs or gallops. Abdomen: Soft, non-tender, non-distended  Musculoskeletal: No clubbing, cyanosis  Skin: Skin color, texture, turgor normal.  No rashes or lesions. Neurologic:  No focal weakness.   Psychiatric: Alert and oriented  Capillary Refill: Brisk,3 seconds, normal   Peripheral Pulses: +2 palpable, equal bilaterally       Labs:   Recent Labs     07/09/22  1115   WBC 8.8   HGB 12.7   HCT 37.9   PLT 86*     Recent Labs     07/09/22  1115      K 4.3      CO2 23   BUN 29*   CREATININE 1.0   CALCIUM 8.6     No results for input(s): AST, ALT, BILIDIR, BILITOT, ALKPHOS in the last 72 hours. No results for input(s): INR in the last 72 hours. No results for input(s): Caroline Fuchs in the last 72 hours. Urinalysis:      Lab Results   Component Value Date/Time    NITRU Negative 07/10/2022 07:05 PM    WBCUA 3 07/10/2022 07:05 PM    BACTERIA None Seen 07/10/2022 07:05 PM    RBCUA 2 07/10/2022 07:05 PM    BLOODU Negative 07/10/2022 07:05 PM    SPECGRAV 1.027 07/10/2022 07:05 PM    GLUCOSEU Negative 07/10/2022 07:05 PM       Radiology:  CT THORACIC SPINE WO CONTRAST   Final Result   1. Findings of diffuse osseous metastatic disease are new/progressed from   the prior chest CT done April 19, 2022.      2.  Subacute T10 inferior endplate compression fracture with 25% height loss   may represent a pathologic fracture. No significant posterior fracture   retropulsion. 3.  Minimal T12 superior endplate height loss with subtle cortical defect   could represent an acute/subacute compression fracture. 4.  Nondisplaced fracture of the T10 spinous process. 5.  Subtle rounded 1.8 cm area of hypoattenuation in the hepatic dome raises   concern for metastatic disease and could be further evaluated with CT abdomen   pelvis. RECOMMENDATIONS:   Follow-up MRI may be helpful for further evaluation. CT LUMBAR SPINE WO CONTRAST   Final Result   1. Findings of diffuse osseous metastatic disease are new/progressed   compared to CT abdomen pelvis done April 19, 2022.      2.  No acute fracture in the lumbar spine. 3.  Multilevel degenerative changes in the lumbar spine. 4.  Grade 1 anterolisthesis of L5 on S1 secondary to chronic bilateral L5   pars interarticularis defects. 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 Metastatic stage IV breast cancer, oncology consulted, apparently patient was not tolerating chemotherapy, plan for hospice care. Pain management. Meeting with hospice today. Plan of care discussed with patient and her daughter at bedside all questions answered. 2.  Intractable Back pain with multiple vertebral lesions along with vertebral fracture, neurosurgery consulted, brace placed. Pain management. Apparently patient and family declined kyphoplasty radiation therapy for steroids started per oncology  3. Dysphagia, comfort measures for now  4. Generalized weakness, due to above  5. Constipation, added stimulant. Add milk of magnesia      Diet: ADULT DIET;  Regular  ADULT ORAL NUTRITION SUPPLEMENT; Breakfast, Dinner; Fortified Pudding Oral Supplement  Code Status: DNR-CC      Lenora Santiago MD

## 2022-07-12 NOTE — PLAN OF CARE
Problem: Discharge Planning  Goal: Discharge to home or other facility with appropriate resources  7/11/2022 2239 by Coral Sarabia RN  Note: Patient will discharged with appropriate resources   7/11/2022 0924 by Stacey Soto RN  Outcome: Progressing  Flowsheets  Taken 7/11/2022 0800 by Stacey Soto RN  Discharge to home or other facility with appropriate resources: Identify barriers to discharge with patient and caregiver  Taken 7/10/2022 0953 by Kandee Landau, RN  Discharge to home or other facility with appropriate resources: Identify barriers to discharge with patient and caregiver     Problem: Pain  Goal: Verbalizes/displays adequate comfort level or baseline comfort level  7/11/2022 2239 by Coral Sraabia RN  Note: Patients comfort level will be at baseline   7/11/2022 0924 by Stacey Soto RN  Outcome: Progressing  [address] (Taken 7/10/2022 0951 by Kandee Landau, RN)  Verbalizes/displays adequate comfort level or baseline comfort level: Encourage patient to monitor pain and request assistance     Problem: Skin/Tissue Integrity  Goal: Absence of new skin breakdown  Description: 1. Monitor for areas of redness and/or skin breakdown  2. Assess vascular access sites hourly  3. Every 4-6 hours minimum:  Change oxygen saturation probe site  4. Every 4-6 hours:  If on nasal continuous positive airway pressure, respiratory therapy assess nares and determine need for appliance change or resting period.   7/11/2022 2239 by Coral Sarabia RN  Note: Patient will be absent of new skin breakdown  7/11/2022 0924 by Stacey Soto RN  Outcome: Progressing  Note: No new skin breakdown noted     Problem: Safety - Adult  Goal: Free from fall injury  7/11/2022 2239 by Coral Sarabia RN  Flowsheets (Taken 7/11/2022 0936 by Stacey Soto RN)  Free From Fall Injury: Instruct family/caregiver on patient safety  Note: Patient will be free from falls   7/11/2022 0924 by Stacey Soto RN  Outcome: Progressing  Flowsheets (Taken 7/10/2022 1202 by Leslie Wang, RN)  Free From Fall Injury: Instruct family/caregiver on patient safety     Problem: ABCDS Injury Assessment  Goal: Absence of physical injury  7/11/2022 2239 by Jewels Canales RN  Flowsheets (Taken 7/11/2022 0936 by Jamil Wong, RN)  Absence of Physical Injury: Implement safety measures based on patient assessment  Note: Patient will be absent of physical injury  7/11/2022 0924 by Jamil Wong, RN  Outcome: Progressing  [address] (Taken 7/10/2022 1202 by Leslie Wang, MELANI)  Absence of Physical Injury: Implement safety measures based on patient assessment     Problem: Nutrition Deficit:  Goal: Optimize nutritional status  7/11/2022 2239 by Jewels Canales RN  Note: Patient will have optimal nutrition status   7/11/2022 1315 by Ely Araiza RD, LD  Outcome: Progressing

## 2022-07-12 NOTE — PROGRESS NOTES
Patient refusing PRN Oxycodone at this time. States pain is tolerable and feels comfortable in current position.  Will reassess in 30 minutes per family request.

## 2022-07-12 NOTE — PROGRESS NOTES
Patient resting in bed with eyes closed. Nasal cannula off at this time per family request. States nasal cannula was irritating patient. Removed by family. Respirations appear deep and agonal at times. Family refusing for O2 saturation to be checked. Want to leave oxygen off at this time. Requesting IV Dilaudid when due. Will continue to monitor.      Electronically signed by Mohit Ashley RN on 7/12/2022 at 4:46 PM

## 2022-07-12 NOTE — PROGRESS NOTES
Family asking for PRN order for Ativan in case patient gets agitated again. Secure message sent to Dr. Jonnathan Carrillo. No new orders at this time.

## 2022-07-13 VITALS
DIASTOLIC BLOOD PRESSURE: 85 MMHG | BODY MASS INDEX: 23.55 KG/M2 | HEIGHT: 63 IN | WEIGHT: 132.94 LBS | RESPIRATION RATE: 20 BRPM | HEART RATE: 82 BPM | SYSTOLIC BLOOD PRESSURE: 186 MMHG | OXYGEN SATURATION: 85 % | TEMPERATURE: 98.5 F

## 2022-07-13 PROCEDURE — 1200000000 HC SEMI PRIVATE

## 2022-07-13 PROCEDURE — 6370000000 HC RX 637 (ALT 250 FOR IP): Performed by: INTERNAL MEDICINE

## 2022-07-13 PROCEDURE — 6360000002 HC RX W HCPCS: Performed by: INTERNAL MEDICINE

## 2022-07-13 PROCEDURE — 94760 N-INVAS EAR/PLS OXIMETRY 1: CPT

## 2022-07-13 PROCEDURE — 2580000003 HC RX 258: Performed by: INTERNAL MEDICINE

## 2022-07-13 RX ORDER — SENNA AND DOCUSATE SODIUM 50; 8.6 MG/1; MG/1
2 TABLET, FILM COATED ORAL 2 TIMES DAILY PRN
Qty: 1 TABLET | Refills: 0
Start: 2022-07-13

## 2022-07-13 RX ORDER — SENNA PLUS 8.6 MG/1
1 TABLET ORAL NIGHTLY
Qty: 30 TABLET | Refills: 0
Start: 2022-07-13 | End: 2022-08-12

## 2022-07-13 RX ORDER — METHOCARBAMOL 500 MG/1
1000 TABLET, FILM COATED ORAL 4 TIMES DAILY PRN
Qty: 1 TABLET | Refills: 0
Start: 2022-07-13 | End: 2022-07-23

## 2022-07-13 RX ADMIN — Medication 10 ML: at 10:06

## 2022-07-13 RX ADMIN — DEXAMETHASONE SODIUM PHOSPHATE 4 MG: 4 INJECTION, SOLUTION INTRAMUSCULAR; INTRAVENOUS at 01:00

## 2022-07-13 RX ADMIN — HYDROMORPHONE HYDROCHLORIDE 1 MG: 1 INJECTION, SOLUTION INTRAMUSCULAR; INTRAVENOUS; SUBCUTANEOUS at 10:06

## 2022-07-13 RX ADMIN — HYDROMORPHONE HYDROCHLORIDE 1 MG: 1 INJECTION, SOLUTION INTRAMUSCULAR; INTRAVENOUS; SUBCUTANEOUS at 13:30

## 2022-07-13 RX ADMIN — HYDROMORPHONE HYDROCHLORIDE 1 MG: 1 INJECTION, SOLUTION INTRAMUSCULAR; INTRAVENOUS; SUBCUTANEOUS at 07:06

## 2022-07-13 RX ADMIN — Medication 10 ML: at 09:00

## 2022-07-13 RX ADMIN — HYDROMORPHONE HYDROCHLORIDE 1 MG: 1 INJECTION, SOLUTION INTRAMUSCULAR; INTRAVENOUS; SUBCUTANEOUS at 20:03

## 2022-07-13 RX ADMIN — DEXAMETHASONE SODIUM PHOSPHATE 4 MG: 4 INJECTION, SOLUTION INTRAMUSCULAR; INTRAVENOUS at 18:49

## 2022-07-13 RX ADMIN — HYDROMORPHONE HYDROCHLORIDE 1 MG: 1 INJECTION, SOLUTION INTRAMUSCULAR; INTRAVENOUS; SUBCUTANEOUS at 16:38

## 2022-07-13 RX ADMIN — OXYCODONE 5 MG: 5 TABLET ORAL at 14:29

## 2022-07-13 RX ADMIN — DEXAMETHASONE SODIUM PHOSPHATE 4 MG: 4 INJECTION, SOLUTION INTRAMUSCULAR; INTRAVENOUS at 13:30

## 2022-07-13 RX ADMIN — DEXAMETHASONE SODIUM PHOSPHATE 4 MG: 4 INJECTION, SOLUTION INTRAMUSCULAR; INTRAVENOUS at 06:55

## 2022-07-13 RX ADMIN — DORZOLAMIDE HYDROCHLORIDE AND TIMOLOL MALEATE 1 DROP: 20; 5 SOLUTION/ DROPS OPHTHALMIC at 08:59

## 2022-07-13 RX ADMIN — HYDROMORPHONE HYDROCHLORIDE 1 MG: 1 INJECTION, SOLUTION INTRAMUSCULAR; INTRAVENOUS; SUBCUTANEOUS at 03:30

## 2022-07-13 ASSESSMENT — PAIN DESCRIPTION - ONSET
ONSET: ON-GOING

## 2022-07-13 ASSESSMENT — PAIN DESCRIPTION - DESCRIPTORS
DESCRIPTORS: PATIENT UNABLE TO DESCRIBE
DESCRIPTORS: ACHING;DISCOMFORT

## 2022-07-13 ASSESSMENT — PAIN - FUNCTIONAL ASSESSMENT
PAIN_FUNCTIONAL_ASSESSMENT: PREVENTS OR INTERFERES SOME ACTIVE ACTIVITIES AND ADLS

## 2022-07-13 ASSESSMENT — PAIN SCALES - GENERAL
PAINLEVEL_OUTOF10: 7
PAINLEVEL_OUTOF10: 0
PAINLEVEL_OUTOF10: 7
PAINLEVEL_OUTOF10: 7
PAINLEVEL_OUTOF10: 0
PAINLEVEL_OUTOF10: 7

## 2022-07-13 ASSESSMENT — PAIN DESCRIPTION - LOCATION
LOCATION: BACK

## 2022-07-13 ASSESSMENT — PAIN DESCRIPTION - ORIENTATION
ORIENTATION: LOWER

## 2022-07-13 ASSESSMENT — PAIN DESCRIPTION - PAIN TYPE
TYPE: CHRONIC PAIN
TYPE: ACUTE PAIN

## 2022-07-13 ASSESSMENT — PAIN DESCRIPTION - FREQUENCY
FREQUENCY: CONTINUOUS

## 2022-07-13 NOTE — PROGRESS NOTES
Family asking for sublingual pain medicine. Secure message sent to Dr. Ty Phillip. Waiting for response.

## 2022-07-13 NOTE — PROGRESS NOTES
Patient awake and alert. Answering questions appropriately. Daughter at bedside. Agreeable to repositioning. Turned to right side. No PO medications given per family. Patient denies pain at this time. Sheth in place. Will continue to monitor.      Electronically signed by Maynor Blanco RN on 7/13/2022 at 9:09 AM

## 2022-07-13 NOTE — PROGRESS NOTES
Patient non verbal throughout the shift. Unable to assess orientation/ Family requested Dilaudid every 3 hours, meds tolerated well. NC not on, oxygen hoovered around 81% this shift. Patient appears comfortable. Son at beside.

## 2022-07-13 NOTE — CARE COORDINATION
Patient will d/c to [facility] location at 8:30pm tonight via [employer].     Electronically signed by Tori Tyler on 7/13/2022 at 3:33 PM

## 2022-07-13 NOTE — PROGRESS NOTES
Physician Progress Note      Alpa Alba  [facility] #:                  [id number]  :                       1933  ADMIT DATE:       2022 9:29 AM  DISCH DATE:  RESPONDING  PROVIDER #:        Mouna Patrick MD          QUERY TEXT:    Pt admitted with pain 2/2 metastatic cancer and has encephalopathy documented. If possible, please document in progress notes and discharge summary further   specificity regarding the type of encephalopathy:    The medical record reflects the following:  Risk Factors:  Uncontrolled  metastatic cancer pain  Clinical Indicators: per PN \"Encephalopathy, multifactorial, seems comfortable   at this time? \"  Treatment: OxyContin 20mg BID, Diluadid 1mg IV q3hrs. prn, and Oxycodone IR q   4hrs. prn and IV Steriods  Options provided:  -- Drug-induced encephalopathy due to OxyContin 20mg BID, Diluadid 1mg IV   q3hrs. prn, and Oxycodone IR q 4hrs. prn  -- Drug-induced encephalopathy due to, Please specify substance.   -- Toxic metabolic encephalopathy  -- Other - I will add my own diagnosis  -- Disagree - Not applicable / Not valid  -- Disagree - Clinically unable to determine / Unknown  -- Refer to Clinical Documentation Reviewer    PROVIDER RESPONSE TEXT:    Multifactorial encephalopathy    Query created by: Rich Lauren on 2022 12:33 PM      Electronically signed by:  Mouna Patrick MD 2022 12:36 PM

## 2022-07-13 NOTE — CARE COORDINATION
Followed up with daughter Ortiz Rashid; family have decided to go with [address] [place] [address]. [place] [facility] [place] understand this depends on bed availability. Discussed with [address] [place] [address] liaison who will follow up.   Electronically signed by Immanuel Owens on 7/13/2022 at 11:53 AM

## 2022-07-13 NOTE — PLAN OF CARE
Problem: Discharge Planning  Goal: Discharge to home or other facility with appropriate resources  Outcome: Progressing  Note: Assessed patients knowledge of discharge. Will continue to work with patient on discharge planning and answer patient questions. Will consult case management and  as necessary. Electronically signed by Demario Vasquez RN on 7/13/2022 at 7:53 AM      Problem: Pain  Goal: Verbalizes/displays adequate comfort level or baseline comfort level  Outcome: Progressing  Note: Educated patient on pain management. Will assess patients pain level per unit protocol, and provide pain management measures as needed. Electronically signed by Demario Vasquez RN on 7/13/2022 at 7:53 AM      Problem: Skin/Tissue Integrity  Goal: Absence of new skin breakdown  Description: 1. Monitor for areas of redness and/or skin breakdown  2. Assess vascular access sites hourly  3. Every 4-6 hours minimum:  Change oxygen saturation probe site  4. Every 4-6 hours:  If on nasal continuous positive airway pressure, respiratory therapy assess nares and determine need for appliance change or resting period. Outcome: Progressing  Note: Will monitor skin and mucous membranes. Will turn patient every 2 hours, monitor for friction and sheering, and change dressings as needed. Will preform skin assessment every shift. Electronically signed by Demario Vasquez RN on 7/13/2022 at 7:53 AM      Problem: Safety - Adult  Goal: Free from fall injury  Outcome: Progressing  Flowsheets (Taken 7/13/2022 0000 by Star Gamez)  Free From Fall Injury: Instruct family/caregiver on patient safety  Note: Bed in lowest position, wheels locked, bed/chair exit alarm in place, call light within reach, and non skid footwear on. Walkway free of clutter. Pt alert and oriented and able to make needs known. Pt educated to use call light when needing to get up, and pt utilizes call light to make needs known.  Will continue to monitor.   Electronically signed by Ivelisse Virgen RN on 7/13/2022 at 7:53 AM

## 2022-07-13 NOTE — PROGRESS NOTES
Hospitalist Progress Note      PCP: Loyd Henderson DO    Date of Admission: 7/9/2022      Subjective: Seems comfortable awake, daughter at bedside. Back pain controlled at this time    Medications:  Reviewed    Infusion Medications    sodium chloride       Scheduled Medications    oxyCODONE  20 mg Oral 2 times per day    dexamethasone  4 mg IntraVENous Q6H    lidocaine  1 patch TransDERmal Daily    psyllium  1 packet Oral Daily    multivitamin  1 tablet Oral Daily    latanoprost  1 drop Both Eyes Nightly    dorzolamide-timolol  1 drop Both Eyes BID    enoxaparin  40 mg SubCUTAneous Daily    senna  1 tablet Oral Nightly     PRN Meds: magnesium hydroxide, LORazepam, sennosides-docusate sodium, HYDROmorphone, oxyCODONE, sodium chloride flush, sodium chloride, potassium chloride **OR** potassium alternative oral replacement **OR** potassium chloride, magnesium sulfate, ondansetron **OR** ondansetron, acetaminophen **OR** acetaminophen, melatonin, calcium carbonate, methocarbamol      Intake/Output Summary (Last 24 hours) at 7/13/2022 0940  Last data filed at 7/13/2022 0907  Gross per 24 hour   Intake 0 ml   Output 500 ml   Net -500 ml       Physical Exam Performed:    BP (!) 179/91   Pulse 83   Temp 97.6 °F (36.4 °C) (Oral)   Resp 22   Ht 5' 3\" (1.6 m)   Wt 132 lb 15 oz (60.3 kg)   SpO2 (!) 88% Comment: family does not want O2  BMI 23.55 kg/m²     General appearance: No apparent distress  Neck: Supple  Respiratory:  Normal respiratory effort. Clear to auscultation, bilaterally without Rales/Wheezes/Rhonchi. Cardiovascular: Regular rate and rhythm with normal S1/S2 without murmurs, rubs or gallops. Abdomen: Soft, non-tender  Musculoskeletal: No clubbing  Skin: Skin color, texture, turgor normal.  No rashes or lesions. Neurologic: Moving her extremities.   Psychiatric: Awake  Capillary Refill: Brisk,3 seconds, normal   Peripheral Pulses: +2 palpable, equal bilaterally       Labs:   No results for input(s): WBC, HGB, HCT, PLT in the last 72 hours. No results for input(s): NA, K, CL, CO2, BUN, CREATININE, CALCIUM, PHOS in the last 72 hours. Invalid input(s): MAGNES  No results for input(s): AST, ALT, BILIDIR, BILITOT, ALKPHOS in the last 72 hours. No results for input(s): INR in the last 72 hours. No results for input(s): Hannah Height in the last 72 hours. Urinalysis:      Lab Results   Component Value Date/Time    NITRU Negative 07/10/2022 07:05 PM    WBCUA 3 07/10/2022 07:05 PM    BACTERIA None Seen 07/10/2022 07:05 PM    RBCUA 2 07/10/2022 07:05 PM    BLOODU Negative 07/10/2022 07:05 PM    SPECGRAV 1.027 07/10/2022 07:05 PM    GLUCOSEU Negative 07/10/2022 07:05 PM       Radiology:  CT THORACIC SPINE WO CONTRAST   Final Result   1. Findings of diffuse osseous metastatic disease are new/progressed from   the prior chest CT done April 19, 2022.      2.  Subacute T10 inferior endplate compression fracture with 25% height loss   may represent a pathologic fracture. No significant posterior fracture   retropulsion. 3.  Minimal T12 superior endplate height loss with subtle cortical defect   could represent an acute/subacute compression fracture. 4.  Nondisplaced fracture of the T10 spinous process. 5.  Subtle rounded 1.8 cm area of hypoattenuation in the hepatic dome raises   concern for metastatic disease and could be further evaluated with CT abdomen   pelvis. RECOMMENDATIONS:   Follow-up MRI may be helpful for further evaluation. CT LUMBAR SPINE WO CONTRAST   Final Result   1. Findings of diffuse osseous metastatic disease are new/progressed   compared to CT abdomen pelvis done April 19, 2022.      2.  No acute fracture in the lumbar spine. 3.  Multilevel degenerative changes in the lumbar spine. 4.  Grade 1 anterolisthesis of L5 on S1 secondary to chronic bilateral L5   pars interarticularis defects.                  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  Metastatic stage IV breast cancer, oncology consulted, apparently patient was not tolerating chemotherapy, plan for hospice care.  Pain management. Family met with hospice yesterday  2.  Intractable Back pain with multiple vertebral lesions along with vertebral fracture, neurosurgery consulted, brace placed.  Pain management.  Apparently patient and family declined kyphoplasty radiation therapy for steroids started per oncology. 3.  Dysphagia, comfort measures for now  4.  Generalized weakness, due to above  5.  Constipation, added stimulant. Add milk of magnesia  6. Encephalopathy, multifactorial, seems comfortable at this time    Diet: ADULT DIET;  Regular  ADULT ORAL NUTRITION SUPPLEMENT; Breakfast, Dinner; Fortified Pudding Oral Supplement  Code Status: DNR-CC      Bhavana Weller MD

## 2022-07-14 NOTE — PROGRESS NOTES
This RN was instructed to administer 1mg Hydromorphone,  30 minutes prior to discharge, at 2000. Claribel Richard arrived 30 minutes earlier than expected, at 2000. This RN explained to transport the circumstances and they agreed to wait around for 30 minutes, post pain medication administration. This is to prevent pt from leaving immediately post IV administration. LCWP and Sheth will remain accessed. Daughter at bedside and plans to transport to [facility] [address], as well.   Genesis Hoff RN

## 2022-07-14 NOTE — DISCHARGE SUMMARY
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 Hospital Course:       1.  Metastatic stage IV breast cancer, oncology consulted, apparently patient was not tolerating chemotherapy, plan for hospice care. Antoine Pool management.    Family met with hospice and patient will be transferred to hospice, pain management will be ordered per hospice. 2.  Intractable Back pain with multiple vertebral lesions along with vertebral fracture, neurosurgery consulted, brace placed.  Pain management.  Apparently patient and family declined kyphoplasty radiation therapy for steroids started per oncology. 3.  Dysphagia, comfort measures for now  4.  Generalized weakness, due to above  5.  Constipation, added stimulant.  Add milk of magnesia  6. Encephalopathy, multifactorial, seems comfortable at this time          Physical Exam Performed:     BP (!) 186/85   Pulse 82   Temp 98.5 °F (36.9 °C) (Oral)   Resp 20   Ht 5' 3\" (1.6 m)   Wt 132 lb 15 oz (60.3 kg)   SpO2 (!) 85%   BMI 23.55 kg/m²       Per progress note      Labs:  For convenience and continuity at follow-up the following most recent labs are provided:      CBC:    Lab Results   Component Value Date/Time    WBC 8.8 07/09/2022 11:15 AM    HGB 12.7 07/09/2022 11:15 AM    HCT 37.9 07/09/2022 11:15 AM    PLT 86 07/09/2022 11:15 AM Renal:    Lab Results   Component Value Date/Time     07/09/2022 11:15 AM    K 4.3 07/09/2022 11:15 AM     07/09/2022 11:15 AM    CO2 23 07/09/2022 11:15 AM    BUN 29 07/09/2022 11:15 AM    CREATININE 1.0 07/09/2022 11:15 AM    CALCIUM 8.6 07/09/2022 11:15 AM         Significant Diagnostic Studies    Radiology:   CT THORACIC SPINE WO CONTRAST   Final Result   1. Findings of diffuse osseous metastatic disease are new/progressed from   the prior chest CT done April 19, 2022.      2.  Subacute T10 inferior endplate compression fracture with 25% height loss   may represent a pathologic fracture. No significant posterior fracture   retropulsion. 3.  Minimal T12 superior endplate height loss with subtle cortical defect   could represent an acute/subacute compression fracture. 4.  Nondisplaced fracture of the T10 spinous process. 5.  Subtle rounded 1.8 cm area of hypoattenuation in the hepatic dome raises   concern for metastatic disease and could be further evaluated with CT abdomen   pelvis. RECOMMENDATIONS:   Follow-up MRI may be helpful for further evaluation. CT LUMBAR SPINE WO CONTRAST   Final Result   1. Findings of diffuse osseous metastatic disease are new/progressed   compared to CT abdomen pelvis done April 19, 2022.      2.  No acute fracture in the lumbar spine. 3.  Multilevel degenerative changes in the lumbar spine. 4.  Grade 1 anterolisthesis of L5 on S1 secondary to chronic bilateral L5   pars interarticularis defects.                 Consults:     IP CONSULT TO HOSPITALIST  IP CONSULT TO HOSPICE  IP CONSULT TO PALLIATIVE CARE    Disposition:  hospice     Condition at Discharge: Stable    Discharge Instructions/Follow-up:  Hospice care    Code Status:  DNR-CC     Activity: activity as tolerated    Diet: regular diet      Discharge Medications:     Current Discharge Medication List           Details   senna (SENOKOT) 8.6 MG tablet Take 1 tablet by mouth nightly  Qty: 30 tablet, Refills: 0      sennosides-docusate sodium (SENOKOT-S) 8.6-50 MG tablet Take 2 tablets by mouth 2 times daily as needed for Constipation  Qty: 1 tablet, Refills: 0      methocarbamol (ROBAXIN) 500 MG tablet Take 2 tablets by mouth 4 times daily as needed (spasms)  Qty: 1 tablet, Refills: 0              Details   dorzolamide-timolol (COSOPT) 22.3-6.8 MG/ML ophthalmic solution Place 1 drop into both eyes 2 times daily       latanoprost (XALATAN) 0.005 % ophthalmic solution Place 1 drop into both eyes nightly       Elastic Bandages & Supports (MEDICAL COMPRESSION STOCKINGS) MISC 1 each by Does not apply route daily as needed (20-30mm) 20-30 mm compression  Qty: 1 each, Refills: 2    Associated Diagnoses: Leg swelling             Time Spent on discharge is more than 45 minutes in the examination, evaluation, counseling and review of medications and discharge plan. Signed:    Dayana Causey MD   7/13/2022      Thank you Zora Hall DO for the opportunity to be involved in this patient's care. If you have any questions or concerns, please feel free to contact me at [phone].

## 2022-07-14 NOTE — PROGRESS NOTES
[facility]  Oncology Hematology Care  Progress Note      SUBJECTIVE:   Late entry   Daughter at bedside-hoping for inpt hospice  Daughter worreid about swallowing meds and agitation -discussed sublingual can be used and iv   OBJECTIVE      Medications    No current facility-administered medications for this encounter. Physical    VITALS:  BP (!) 186/85   Pulse 82   Temp 98.5 °F (36.9 °C) (Oral)   Resp 20   Ht 5' 3\" (1.6 m)   Wt 132 lb 15 oz (60.3 kg)   SpO2 (!) 85%   BMI 23.55 kg/m²   TEMPERATURE:  Current - Temp: 98.5 °F (36.9 °C); Max - Temp  Av.5 °F (36.9 °C)  Min: 98.5 °F (36.9 °C)  Max: 98.5 °F (36.9 °C)  PULSE OXIMETRY RANGE: SpO2  Av %  Min: 85 %  Max: 88 %  24HR INTAKE/OUTPUT:      Intake/Output Summary (Last 24 hours) at 2022 0835  Last data filed at 2022 1643  Gross per 24 hour   Intake 60 ml   Output 500 ml   Net -440 ml       CONSTITUTIONAL:no agitation resting not participating in conversation       Data      No results for input(s): WBC, HGB, HCT, PLT, MCV in the last 72 hours. No results for input(s): NA, K, CL, CO2, PHOS, BUN, CREATININE, CA in the last 72 hours. No results for input(s): AST, ALT, ALB, BILIDIR, BILITOT, ALKPHOS in the last 72 hours. Magnesium:  No results found for: MG    Imaging XR CHEST (2 VW)    Result Date: 2022  EXAMINATION: TWO XRAY VIEWS OF THE CHEST 2022 3:23 pm COMPARISON: 2022 HISTORY: ORDERING SYSTEM PROVIDED HISTORY: Dyspnea and respiratory abnormality TECHNOLOGIST PROVIDED HISTORY: Reason for Exam: Dyspnea and respiratory abnormality FINDINGS: Heart size is normal.  Mediastinal contours are normal.  Pulmonary vascularity is normal. A few bandlike opacities are seen at the lung bases, likely subsegmental atelectasis. No focal lung consolidation is noted. Left-sided MediPort is seen. Tip projects at the level of the cavoatrial junction. .  Atherosclerotic change is seen in aorta.      Bandlike opacities are seen at the lung bases, likely subsegmental atelectasis or scar. No pneumonia or edema     CT THORACIC SPINE WO CONTRAST    Result Date: 7/9/2022  EXAMINATION: CT OF THE THORACIC SPINE WITHOUT CONTRAST  7/9/2022 11:10 am: TECHNIQUE: CT of the thoracic spine was performed without the administration of intravenous contrast. Multiplanar reformatted images are provided for review. Automated exposure control, iterative reconstruction, and/or weight based adjustment of the mA/kV was utilized to reduce the radiation dose to as low as reasonably achievable. COMPARISON: Chest CT done 04/19/2022. chest radiographs done June 22, 2022. HISTORY: ORDERING SYSTEM PROVIDED HISTORY: Low back pain, NKI, being tx for breast CA TECHNOLOGIST PROVIDED HISTORY: Reason for exam:->Low back pain, NKI, being tx for breast CA Reason for Exam: Low back pain, NKI, being tx for breast CA FINDINGS: BONES/ALIGNMENT: Subacute T10 inferior endplate compression fracture with 25% height loss and osseous sclerosis is similar compared to chest radiographs done June 22, 2022. No significant posterior fracture retropulsion. Nondisplaced fracture of the T10 spinous process. Minimal T12 superior endplate height loss with subtle cortical defect. The other thoracic vertebral body heights are maintained. No at the evidence of fracture in the thoracic spine. Findings of diffuse osseous metastatic disease with numerous scattered areas of osseous lucency and sclerosis throughout. Findings are new/progressed from the prior chest CT. DEGENERATIVE CHANGES: Multilevel degenerative disc and facet joint disease. No gross spinal canal stenosis or bony neural foraminal narrowing of the thoracic spine. SOFT TISSUES: No paraspinal mass is seen. Atherosclerosis. Coronary artery calcifications. Bilateral peripelvic renal cysts versus pelvicaliectasis. Subtle rounded 1.8 cm area of hypoattenuation in the hepatic dome. Left IJ port with tip near the superior atrial caval junction. Trace right greater than left pleural fluid. 1.  Findings of diffuse osseous metastatic disease are new/progressed from the prior chest CT done April 19, 2022. 2.  Subacute T10 inferior endplate compression fracture with 25% height loss may represent a pathologic fracture. No significant posterior fracture retropulsion. 3.  Minimal T12 superior endplate height loss with subtle cortical defect could represent an acute/subacute compression fracture. 4.  Nondisplaced fracture of the T10 spinous process. 5.  Subtle rounded 1.8 cm area of hypoattenuation in the hepatic dome raises concern for metastatic disease and could be further evaluated with CT abdomen pelvis. RECOMMENDATIONS: Follow-up MRI may be helpful for further evaluation. CT LUMBAR SPINE WO CONTRAST    Result Date: 7/9/2022  EXAMINATION: CT OF THE LUMBAR SPINE WITHOUT CONTRAST  7/9/2022 TECHNIQUE: CT of the lumbar spine was performed without the administration of intravenous contrast. Multiplanar reformatted images are provided for review. Adjustment of mA and/or kV according to patient size was utilized. Automated exposure control, iterative reconstruction, and/or weight based adjustment of the mA/kV was utilized to reduce the radiation dose to as low as reasonably achievable. COMPARISON: CT abdomen pelvis done April 19, 2022. HISTORY: ORDERING SYSTEM PROVIDED HISTORY: Low back pain, NKI, being tx for breast CA TECHNOLOGIST PROVIDED HISTORY: Reason for exam:->Low back pain, NKI, being tx for breast CA Decision Support Exception - unselect if not a suspected or confirmed emergency medical condition->Emergency Medical Condition (MA) Reason for Exam: Low back pain, NKI, being tx for breast CA FINDINGS: BONES/ALIGNMENT: Findings of diffuse osseous metastatic disease with numerous sclerotic/lucent lesions throughout the visualized osseous structures.   For example, there is a rounded mixed sclerotic and lucent lesion in the right L2 vertebral body which measures 2.3 cm. No acute fracture. Grade 1 anterolisthesis of L5 on S1 measures 8 mm. Chronic bilateral L5 pars interarticularis defects. Grade 1 degenerative anterolisthesis of L4 on L5 measures 3 mm. No significant listhesis at the other levels. DEGENERATIVE CHANGES: Multilevel degenerative disc and facet joint disease. Multilevel disc bulges. Mild-to-moderate L3-L4 and L4-L5 spinal canal stenosis secondary to disc bulges, facet hypertrophy and ligamentum flavum thickening multilevel neural foraminal narrowing is most pronounced and severe bilaterally at L5-S1. SOFT TISSUES/RETROPERITONEUM: No paraspinal mass is seen. Atherosclerosis. 1.  Findings of diffuse osseous metastatic disease are new/progressed compared to CT abdomen pelvis done April 19, 2022. 2.  No acute fracture in the lumbar spine. 3.  Multilevel degenerative changes in the lumbar spine. 4.  Grade 1 anterolisthesis of L5 on S1 secondary to chronic bilateral L5 pars interarticularis defects.        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